# Patient Record
Sex: FEMALE | Race: AMERICAN INDIAN OR ALASKA NATIVE | NOT HISPANIC OR LATINO | Employment: FULL TIME | RURAL
[De-identification: names, ages, dates, MRNs, and addresses within clinical notes are randomized per-mention and may not be internally consistent; named-entity substitution may affect disease eponyms.]

---

## 2021-03-15 DIAGNOSIS — R06.02 SOB (SHORTNESS OF BREATH): Primary | ICD-10-CM

## 2021-03-23 DIAGNOSIS — R06.02 SHORTNESS OF BREATH: Primary | ICD-10-CM

## 2021-03-23 DIAGNOSIS — R06.2 WHEEZING: ICD-10-CM

## 2021-05-19 DIAGNOSIS — R06.02 SHORTNESS OF BREATH: Primary | ICD-10-CM

## 2021-05-19 DIAGNOSIS — R06.2 WHEEZING: ICD-10-CM

## 2021-05-19 RX ORDER — MONTELUKAST SODIUM 10 MG/1
10 TABLET ORAL NIGHTLY
COMMUNITY

## 2021-05-19 RX ORDER — IPRATROPIUM BROMIDE AND ALBUTEROL 20; 100 UG/1; UG/1
1 SPRAY, METERED RESPIRATORY (INHALATION) 4 TIMES DAILY
COMMUNITY

## 2021-05-19 RX ORDER — METOPROLOL SUCCINATE 100 MG/1
100 TABLET, EXTENDED RELEASE ORAL DAILY
COMMUNITY

## 2021-05-19 RX ORDER — BUDESONIDE 0.25 MG/2ML
0.25 INHALANT ORAL 2 TIMES DAILY
COMMUNITY
End: 2021-05-20

## 2021-05-19 RX ORDER — LOSARTAN POTASSIUM 50 MG/1
50 TABLET ORAL DAILY
COMMUNITY

## 2021-05-19 RX ORDER — GLYBURIDE-METFORMIN HYDROCHLORIDE 5; 500 MG/1; MG/1
2 TABLET ORAL 2 TIMES DAILY WITH MEALS
COMMUNITY

## 2021-05-19 RX ORDER — IPRATROPIUM BROMIDE 0.5 MG/2.5ML
500 SOLUTION RESPIRATORY (INHALATION) EVERY 4 HOURS PRN
COMMUNITY

## 2021-05-20 ENCOUNTER — HOSPITAL ENCOUNTER (OUTPATIENT)
Dept: RADIOLOGY | Facility: HOSPITAL | Age: 41
Discharge: HOME | End: 2021-05-20
Attending: INTERNAL MEDICINE
Payer: COMMERCIAL

## 2021-05-20 ENCOUNTER — OFFICE VISIT (OUTPATIENT)
Dept: PULMONOLOGY | Facility: CLINIC | Age: 41
End: 2021-05-20
Payer: COMMERCIAL

## 2021-05-20 ENCOUNTER — CLINICAL SUPPORT (OUTPATIENT)
Dept: PULMONOLOGY | Facility: HOSPITAL | Age: 41
End: 2021-05-20
Attending: INTERNAL MEDICINE
Payer: COMMERCIAL

## 2021-05-20 VITALS
OXYGEN SATURATION: 100 % | HEIGHT: 62 IN | DIASTOLIC BLOOD PRESSURE: 104 MMHG | SYSTOLIC BLOOD PRESSURE: 148 MMHG | BODY MASS INDEX: 53.92 KG/M2 | WEIGHT: 293 LBS | HEART RATE: 64 BPM | RESPIRATION RATE: 20 BRPM

## 2021-05-20 DIAGNOSIS — R06.2 WHEEZING: ICD-10-CM

## 2021-05-20 DIAGNOSIS — G47.33 OSA (OBSTRUCTIVE SLEEP APNEA): ICD-10-CM

## 2021-05-20 DIAGNOSIS — E66.9 OBESITY (BMI 35.0-39.9 WITHOUT COMORBIDITY): ICD-10-CM

## 2021-05-20 DIAGNOSIS — R06.02 SHORTNESS OF BREATH: ICD-10-CM

## 2021-05-20 DIAGNOSIS — J45.30 MILD PERSISTENT ASTHMA, UNSPECIFIED WHETHER COMPLICATED: ICD-10-CM

## 2021-05-20 DIAGNOSIS — R06.02 SOB (SHORTNESS OF BREATH): ICD-10-CM

## 2021-05-20 PROBLEM — J45.909 ASTHMA: Status: ACTIVE | Noted: 2021-05-20

## 2021-05-20 PROCEDURE — 1126F PR PAIN SEVERITY QUANTIFIED, NO PAIN PRESENT: ICD-10-PCS | Mod: ,,, | Performed by: INTERNAL MEDICINE

## 2021-05-20 PROCEDURE — 71046 X-RAY EXAM CHEST 2 VIEWS: CPT | Mod: 26,,, | Performed by: RADIOLOGY

## 2021-05-20 PROCEDURE — 1126F AMNT PAIN NOTED NONE PRSNT: CPT | Mod: ,,, | Performed by: INTERNAL MEDICINE

## 2021-05-20 PROCEDURE — 94729 DIFFUSING CAPACITY: CPT | Mod: PBBFAC | Performed by: INTERNAL MEDICINE

## 2021-05-20 PROCEDURE — 94010 BREATHING CAPACITY TEST: CPT | Mod: PBBFAC | Performed by: INTERNAL MEDICINE

## 2021-05-20 PROCEDURE — 94727 GAS DIL/WSHOT DETER LNG VOL: CPT | Mod: 26,,, | Performed by: INTERNAL MEDICINE

## 2021-05-20 PROCEDURE — 71046 X-RAY EXAM CHEST 2 VIEWS: CPT | Mod: TC

## 2021-05-20 PROCEDURE — 94729 DIFFUSING CAPACITY: CPT

## 2021-05-20 PROCEDURE — 3008F PR BODY MASS INDEX (BMI) DOCUMENTED: ICD-10-PCS | Mod: ,,, | Performed by: INTERNAL MEDICINE

## 2021-05-20 PROCEDURE — 94727 PR PULM FUNCTION TEST BY GAS: ICD-10-PCS | Mod: 26,,, | Performed by: INTERNAL MEDICINE

## 2021-05-20 PROCEDURE — 94726 PLETHYSMOGRAPHY LUNG VOLUMES: CPT | Mod: PBBFAC | Performed by: INTERNAL MEDICINE

## 2021-05-20 PROCEDURE — 99214 PR OFFICE/OUTPT VISIT, EST, LEVL IV, 30-39 MIN: ICD-10-PCS | Mod: S$PBB,25,, | Performed by: INTERNAL MEDICINE

## 2021-05-20 PROCEDURE — 94060 EVALUATION OF WHEEZING: CPT

## 2021-05-20 PROCEDURE — 94729 PR C02/MEMBANE DIFFUSE CAPACITY: ICD-10-PCS | Mod: 26,,, | Performed by: INTERNAL MEDICINE

## 2021-05-20 PROCEDURE — 99214 OFFICE O/P EST MOD 30 MIN: CPT | Mod: S$PBB,25,, | Performed by: INTERNAL MEDICINE

## 2021-05-20 PROCEDURE — 94060 PR EVAL OF BRONCHOSPASM: ICD-10-PCS | Mod: 26,,, | Performed by: INTERNAL MEDICINE

## 2021-05-20 PROCEDURE — 94060 EVALUATION OF WHEEZING: CPT | Mod: 26,,, | Performed by: INTERNAL MEDICINE

## 2021-05-20 PROCEDURE — 3008F BODY MASS INDEX DOCD: CPT | Mod: ,,, | Performed by: INTERNAL MEDICINE

## 2021-05-20 PROCEDURE — 94729 DIFFUSING CAPACITY: CPT | Mod: 26,,, | Performed by: INTERNAL MEDICINE

## 2021-05-20 PROCEDURE — 71046 XR CHEST PA AND LATERAL: ICD-10-PCS | Mod: 26,,, | Performed by: RADIOLOGY

## 2021-05-20 PROCEDURE — 99215 OFFICE O/P EST HI 40 MIN: CPT | Mod: PBBFAC,25 | Performed by: INTERNAL MEDICINE

## 2021-05-20 RX ORDER — AMLODIPINE BESYLATE 5 MG/1
5 TABLET ORAL DAILY
Status: ON HOLD | COMMUNITY
End: 2023-09-19 | Stop reason: HOSPADM

## 2021-11-29 ENCOUNTER — HOSPITAL ENCOUNTER (OUTPATIENT)
Dept: RADIOLOGY | Facility: HOSPITAL | Age: 41
Discharge: HOME | End: 2021-11-29
Attending: FAMILY MEDICINE
Payer: COMMERCIAL

## 2021-11-29 DIAGNOSIS — M79.605 LEFT LEG PAIN: ICD-10-CM

## 2021-11-29 PROCEDURE — 93971 EXTREMITY STUDY: CPT | Mod: TC,LT

## 2023-07-28 ENCOUNTER — HOSPITAL ENCOUNTER (EMERGENCY)
Facility: HOSPITAL | Age: 43
Discharge: ANOTHER HOSPITAL | End: 2023-07-28
Attending: EMERGENCY MEDICINE
Payer: COMMERCIAL

## 2023-07-28 VITALS
SYSTOLIC BLOOD PRESSURE: 121 MMHG | TEMPERATURE: 99 F | HEIGHT: 62 IN | DIASTOLIC BLOOD PRESSURE: 97 MMHG | OXYGEN SATURATION: 96 % | BODY MASS INDEX: 53.92 KG/M2 | WEIGHT: 293 LBS | RESPIRATION RATE: 18 BRPM | HEART RATE: 82 BPM

## 2023-07-28 DIAGNOSIS — M79.89 LEG SWELLING: ICD-10-CM

## 2023-07-28 DIAGNOSIS — L03.116 CELLULITIS OF LEFT LOWER EXTREMITY: Primary | ICD-10-CM

## 2023-07-28 PROCEDURE — 99284 EMERGENCY DEPT VISIT MOD MDM: CPT | Mod: 25

## 2023-07-28 PROCEDURE — 99284 PR EMERGENCY DEPT VISIT,LEVEL IV: ICD-10-PCS | Mod: ,,, | Performed by: EMERGENCY MEDICINE

## 2023-07-28 PROCEDURE — 99284 EMERGENCY DEPT VISIT MOD MDM: CPT | Mod: ,,, | Performed by: EMERGENCY MEDICINE

## 2023-07-28 RX ORDER — SULFAMETHOXAZOLE AND TRIMETHOPRIM 800; 160 MG/1; MG/1
1 TABLET ORAL 2 TIMES DAILY
Qty: 20 TABLET | Refills: 0 | Status: SHIPPED | OUTPATIENT
Start: 2023-07-28 | End: 2023-08-07

## 2023-07-28 NOTE — ED TRIAGE NOTES
Presents to ED per [facility] from [facility] due to Elevated D Dimer and the need for ultrasound to rule out DVT.  Denies shortness of breath and chest pain.

## 2023-07-28 NOTE — DISCHARGE INSTRUCTIONS
Take antibiotics as prescribed.  Follow up in clinic at [facility].  Return to emergency department for any worsening or further problems.

## 2023-07-28 NOTE — ED NOTES
Spoke with Dr. Cummings at [facility]. Dr. Chandra will be accepting Mrs. Wilson to room 343 as a direct admit. [facility] will be coming back to transfer patient back to [facility].

## 2023-07-28 NOTE — ED PROVIDER NOTES
Encounter Date: 7/28/2023       History     Chief Complaint   Patient presents with    Abnormal Lab     Patient is a 43-year-old Native-American female who was sent here from [facility] for ultrasound of legs to rule out DVT.  Patient complains of pain and swelling in bilateral lower extremities for 1 day.  No fever, no cough, no chest pain or shortness of breath, no other acute problems or complaints.  Patient does not take hormone medication.  Patient does not smoke.  Patient has no history of cancer.  Patient has no history of previous DVT.  Abrasions on leg are from where patient visited her local medicine man and he performed these for some type of therapeutic purpose.    Review of patient's allergies indicates:  No Known Allergies  Past Medical History:   Diagnosis Date    Diabetes mellitus     Hypertension     SOB (shortness of breath)     Wheeze      Past Surgical History:   Procedure Laterality Date    CATARACT EXTRACTION       Family History   Problem Relation Age of Onset    Diabetes Mother     Hypertension Mother     Hypertension Father     Diabetes Father      Social History     Tobacco Use    Smoking status: Never    Smokeless tobacco: Never   Substance Use Topics    Alcohol use: Yes     Comment: socially    Drug use: Never     Review of Systems   Respiratory:  Negative for shortness of breath.    Cardiovascular:  Positive for leg swelling. Negative for chest pain.   All other systems reviewed and are negative.    Physical Exam     Initial Vitals [07/28/23 1150]   BP Pulse Resp Temp SpO2   108/72 77 18 98.6 °F (37 °C) 96 %      MAP       --         Physical Exam    Nursing note and vitals reviewed.  Constitutional: She appears well-developed and well-nourished.   Patient is morbidly obese.   HENT:   Head: Normocephalic.   Eyes: Pupils are equal, round, and reactive to light.   Cardiovascular:  Normal rate.           Pulmonary/Chest: Breath sounds normal.   Abdominal: Abdomen is soft.    Musculoskeletal:         General: Normal range of motion.      Comments: There is swelling and pitting edema bilateral lower extremities.  There are some superficial abrasions of left lower extremity below-the-knee with cellulitis surrounding left lower extremity.     Neurological: She is alert.   Skin: Skin is warm. Capillary refill takes less than 2 seconds.   Psychiatric: She has a normal mood and affect.       Medical Screening Exam   See Full Note    ED Course   Procedures  Labs Reviewed - No data to display       Imaging Results              US Lower Extremity Veins Bilateral (Final result)  Result time 07/28/23 12:57:45      Final result by Ramon Sanchez MD (07/28/23 12:57:45)                   Impression:      No evidence of deep venous thrombosis in either lower extremity.      Electronically signed by: Ramon Sanchez  Date:    07/28/2023  Time:    12:57               Narrative:    EXAMINATION:  US LOWER EXTREMITY VEINS BILATERAL    CLINICAL HISTORY:  Other specified soft tissue disorders    TECHNIQUE:  Duplex and color flow Doppler and dynamic compression was performed of the bilateral lower extremity veins.  Ultrasound images captured and stored.    COMPARISON:  None    FINDINGS:  Right thigh veins: The common femoral, femoral, popliteal, upper greater saphenous, and deep femoral veins are patent and free of thrombus. The veins are normally compressible and have normal phasic flow and augmentation response.    Right calf veins: The visualized calf veins are patent.    Left thigh veins: The common femoral, femoral, popliteal, upper greater saphenous, and deep femoral veins are patent and free of thrombus. The veins are normally compressible and have normal phasic flow and augmentation response.    Left calf veins: The visualized calf veins are patent.    Miscellaneous: None                                       Medications - No data to display              ED Course as of 07/28/23 1322   Fri Jul 28, 2023    1156 Medical decision-making:  Differential diagnosis includes cellulitis, pedal edema, DVT.  Doppler ultrasound of bilateral lower extremities was ordered by me and interpreted by Radiology. [BB]   1319 Venous Dopplers of both legs are negative for DVT. [BB]      ED Course User Index  [BB] Severiano Obrien MD                Clinical Impression:   Final diagnoses:  [M79.89] Leg swelling  [L03.116] Cellulitis of left lower extremity (Primary)        ED Disposition Condition    Discharge Stable          ED Prescriptions       Medication Sig Dispense Start Date End Date Auth. Provider    sulfamethoxazole-trimethoprim 800-160mg (BACTRIM DS) 800-160 mg Tab Take 1 tablet by mouth 2 (two) times daily. for 10 days 20 tablet 7/28/2023 8/7/2023 Severiano Obrien MD          Follow-up Information    None          Severiano Obrien MD  07/28/23 5918

## 2023-08-15 ENCOUNTER — TELEPHONE (OUTPATIENT)
Dept: ADMINISTRATIVE | Facility: HOSPITAL | Age: 43
End: 2023-08-15
Payer: COMMERCIAL

## 2023-08-15 ENCOUNTER — OFFICE VISIT (OUTPATIENT)
Dept: CARDIOLOGY | Facility: CLINIC | Age: 43
End: 2023-08-15
Payer: COMMERCIAL

## 2023-08-15 VITALS
RESPIRATION RATE: 18 BRPM | HEART RATE: 89 BPM | SYSTOLIC BLOOD PRESSURE: 140 MMHG | BODY MASS INDEX: 53.92 KG/M2 | WEIGHT: 293 LBS | HEIGHT: 62 IN | DIASTOLIC BLOOD PRESSURE: 80 MMHG

## 2023-08-15 DIAGNOSIS — I73.9 CLAUDICATION: ICD-10-CM

## 2023-08-15 DIAGNOSIS — G47.33 OSA (OBSTRUCTIVE SLEEP APNEA): Primary | ICD-10-CM

## 2023-08-15 DIAGNOSIS — S81.801A WOUND OF RIGHT LOWER EXTREMITY, INITIAL ENCOUNTER: Primary | ICD-10-CM

## 2023-08-15 DIAGNOSIS — E11.9 DIABETES MELLITUS WITHOUT COMPLICATION: Primary | ICD-10-CM

## 2023-08-15 DIAGNOSIS — I96 GANGRENE OF TOE OF RIGHT FOOT: ICD-10-CM

## 2023-08-15 DIAGNOSIS — G47.33 OSA (OBSTRUCTIVE SLEEP APNEA): ICD-10-CM

## 2023-08-15 PROCEDURE — 93010 ELECTROCARDIOGRAM REPORT: CPT | Mod: S$PBB,,, | Performed by: STUDENT IN AN ORGANIZED HEALTH CARE EDUCATION/TRAINING PROGRAM

## 2023-08-15 PROCEDURE — 3008F BODY MASS INDEX DOCD: CPT | Mod: ,,, | Performed by: STUDENT IN AN ORGANIZED HEALTH CARE EDUCATION/TRAINING PROGRAM

## 2023-08-15 PROCEDURE — 93010 EKG 12-LEAD: ICD-10-PCS | Mod: S$PBB,,, | Performed by: STUDENT IN AN ORGANIZED HEALTH CARE EDUCATION/TRAINING PROGRAM

## 2023-08-15 PROCEDURE — 1159F PR MEDICATION LIST DOCUMENTED IN MEDICAL RECORD: ICD-10-PCS | Mod: ,,, | Performed by: STUDENT IN AN ORGANIZED HEALTH CARE EDUCATION/TRAINING PROGRAM

## 2023-08-15 PROCEDURE — 3008F PR BODY MASS INDEX (BMI) DOCUMENTED: ICD-10-PCS | Mod: ,,, | Performed by: STUDENT IN AN ORGANIZED HEALTH CARE EDUCATION/TRAINING PROGRAM

## 2023-08-15 PROCEDURE — 99204 PR OFFICE/OUTPT VISIT, NEW, LEVL IV, 45-59 MIN: ICD-10-PCS | Mod: S$PBB,,, | Performed by: STUDENT IN AN ORGANIZED HEALTH CARE EDUCATION/TRAINING PROGRAM

## 2023-08-15 PROCEDURE — 3077F SYST BP >= 140 MM HG: CPT | Mod: ,,, | Performed by: STUDENT IN AN ORGANIZED HEALTH CARE EDUCATION/TRAINING PROGRAM

## 2023-08-15 PROCEDURE — 1159F MED LIST DOCD IN RCRD: CPT | Mod: ,,, | Performed by: STUDENT IN AN ORGANIZED HEALTH CARE EDUCATION/TRAINING PROGRAM

## 2023-08-15 PROCEDURE — 93005 ELECTROCARDIOGRAM TRACING: CPT | Mod: PBBFAC | Performed by: STUDENT IN AN ORGANIZED HEALTH CARE EDUCATION/TRAINING PROGRAM

## 2023-08-15 PROCEDURE — 3077F PR MOST RECENT SYSTOLIC BLOOD PRESSURE >= 140 MM HG: ICD-10-PCS | Mod: ,,, | Performed by: STUDENT IN AN ORGANIZED HEALTH CARE EDUCATION/TRAINING PROGRAM

## 2023-08-15 PROCEDURE — 3079F PR MOST RECENT DIASTOLIC BLOOD PRESSURE 80-89 MM HG: ICD-10-PCS | Mod: ,,, | Performed by: STUDENT IN AN ORGANIZED HEALTH CARE EDUCATION/TRAINING PROGRAM

## 2023-08-15 PROCEDURE — 99215 OFFICE O/P EST HI 40 MIN: CPT | Mod: PBBFAC | Performed by: STUDENT IN AN ORGANIZED HEALTH CARE EDUCATION/TRAINING PROGRAM

## 2023-08-15 PROCEDURE — 99204 OFFICE O/P NEW MOD 45 MIN: CPT | Mod: S$PBB,,, | Performed by: STUDENT IN AN ORGANIZED HEALTH CARE EDUCATION/TRAINING PROGRAM

## 2023-08-15 PROCEDURE — 3079F DIAST BP 80-89 MM HG: CPT | Mod: ,,, | Performed by: STUDENT IN AN ORGANIZED HEALTH CARE EDUCATION/TRAINING PROGRAM

## 2023-08-15 RX ORDER — ALBUTEROL SULFATE 90 UG/1
1 AEROSOL, METERED RESPIRATORY (INHALATION)
COMMUNITY

## 2023-08-15 RX ORDER — FUROSEMIDE 80 MG/1
TABLET ORAL
COMMUNITY

## 2023-08-15 RX ORDER — TRAMADOL HYDROCHLORIDE 50 MG/1
50 TABLET ORAL 2 TIMES DAILY PRN
COMMUNITY

## 2023-08-15 RX ORDER — LEVOTHYROXINE SODIUM 100 UG/1
TABLET ORAL
COMMUNITY

## 2023-08-15 RX ORDER — FUROSEMIDE 40 MG/1
40 TABLET ORAL
COMMUNITY

## 2023-08-15 RX ORDER — HYDRALAZINE HYDROCHLORIDE 100 MG/1
1 TABLET, FILM COATED ORAL 2 TIMES DAILY
COMMUNITY
Start: 2022-10-26 | End: 2023-12-20

## 2023-08-15 RX ORDER — PREGABALIN 75 MG/1
75 CAPSULE ORAL
COMMUNITY

## 2023-08-15 RX ORDER — MONTELUKAST SODIUM 10 MG/1
TABLET ORAL
COMMUNITY

## 2023-08-15 RX ORDER — CARVEDILOL 12.5 MG/1
TABLET ORAL
COMMUNITY

## 2023-08-15 RX ORDER — PREDNISONE 20 MG/1
TABLET ORAL
COMMUNITY

## 2023-08-15 RX ORDER — MULTIVITAMIN
1 TABLET ORAL
COMMUNITY

## 2023-08-15 RX ORDER — ASPIRIN 325 MG
50000 TABLET, DELAYED RELEASE (ENTERIC COATED) ORAL
COMMUNITY

## 2023-08-15 RX ORDER — HYDROXYCHLOROQUINE SULFATE 200 MG/1
TABLET, FILM COATED ORAL
COMMUNITY

## 2023-08-15 RX ORDER — SEVELAMER CARBONATE 800 MG/1
TABLET, FILM COATED ORAL
COMMUNITY
Start: 2022-10-26

## 2023-08-15 RX ORDER — HYDRALAZINE HYDROCHLORIDE 50 MG/1
TABLET, FILM COATED ORAL
Status: ON HOLD | COMMUNITY
End: 2023-09-19 | Stop reason: HOSPADM

## 2023-08-15 RX ORDER — LEVOTHYROXINE SODIUM 50 UG/1
50 TABLET ORAL
COMMUNITY
End: 2023-12-20

## 2023-08-15 RX ORDER — AMLODIPINE BESYLATE 10 MG/1
10 TABLET ORAL
COMMUNITY

## 2023-08-15 RX ORDER — MYCOPHENOLATE MOFETIL 500 MG/1
TABLET ORAL
COMMUNITY
Start: 2023-03-30

## 2023-08-15 RX ORDER — ROSUVASTATIN CALCIUM 10 MG/1
TABLET, COATED ORAL
COMMUNITY

## 2023-08-15 RX ORDER — METOLAZONE 5 MG/1
TABLET ORAL
COMMUNITY

## 2023-08-15 RX ORDER — NIFEDIPINE 60 MG/1
TABLET, EXTENDED RELEASE ORAL
COMMUNITY
Start: 2022-10-27

## 2023-08-15 RX ORDER — FAMOTIDINE 20 MG/1
TABLET, FILM COATED ORAL
COMMUNITY
Start: 2023-02-01

## 2023-08-15 NOTE — PROGRESS NOTES
"PCP: Breana Terrell DO    Referring Provider:     Subjective:   Panchito Wilson is a 43 y.o. female with hx of severe obesity, DM, HLD who presents for evaluation of right lower extremity wound.    Patient referred from Dr. Brandon carter for evaluation of PAD.   Patient reports having 2 week of right lower extremity wound on the toes. Her right DP is palpable and right PT is dopplerable. She had a venous US that was normal.     Will get a QUAN with duplex.     She follows Dr. Baeza at [facility] for cardiac issues.     Fhx: non-contributary  Shx: Denies smoking, etoh or drug use    EKG - 8/15/23 - NSR< RBBB      Lab Results   Component Value Date    K 5.6 (H) 10/20/2022       No results found for: "CHOL"  No results found for: "HDL"  No results found for: "LDLCALC"  No results found for: "TRIG"  No results found for: "CHOLHDL"    No results found for: "WBC", "HGB", "HCT", "MCV", "PLT"        Current Outpatient Medications:     albuterol sulfate (PROAIR RESPICLICK) 90 mcg/actuation inhaler, Inhale 1 puff into the lungs every 4 (four) hours as needed., Disp: , Rfl:     carvediloL (COREG) 12.5 MG tablet, 1 tablet with food Orally Twice a day for 30 day(s), Disp: , Rfl:     cholecalciferol, vitamin D3, 1,250 mcg (50,000 unit) capsule, Take 50,000 Units by mouth., Disp: , Rfl:     famotidine (PEPCID) 20 MG tablet, 1 tablet at bedtime as needed Orally Once a day while on Prednisone for 30 day(s), Disp: , Rfl:     furosemide (LASIX) 80 MG tablet, 1 tablet Orally BID, Disp: , Rfl:     hydrALAZINE (APRESOLINE) 50 MG tablet, 1 tablet with food Orally Three times a day for 30 day(s), Disp: , Rfl:     hydrOXYchloroQUINE (PLAQUENIL) 200 mg tablet, one tablet Orally twice a day for 30 days, Disp: , Rfl:     insulin  unit/mL injection, Inject into the skin 2 (two) times daily before meals., Disp: , Rfl:     insulin NPH-insulin regular, 70/30, (HUMULIN 70/30 U-100 INSULIN) 100 unit/mL (70-30) injection, Inject 90 Units into the skin " once daily., Disp: , Rfl:     ipratropium-albuteroL (COMBIVENT RESPIMAT)  mcg/actuation inhaler, Inhale 1 puff into the lungs 4 (four) times daily. Rescue, Disp: , Rfl:     metOLazone (ZAROXOLYN) 5 MG tablet, 1 tablet Orally Once a day for 30 day(s), Disp: , Rfl:     mometasone (ASMANEX TWISTHALER) 220 mcg/ actuation (30) inhaler, Inhale 1 puff into the lungs 2 (two) times a day. Controller, Disp: , Rfl:     montelukast (SINGULAIR) 10 mg tablet, Take 10 mg by mouth every evening., Disp: , Rfl:     multivitamin with minerals tablet, Take 1 tablet by mouth once daily., Disp: , Rfl:     mycophenolate (CELLCEPT) 500 mg Tab, 1 tablet Orally Twice a day for 30 days, Disp: , Rfl:     predniSONE (DELTASONE) 20 MG tablet, 1 tablet as needed Orally Once a day for 30 day(s), Disp: , Rfl:     pregabalin (LYRICA) 75 MG capsule, Take 75 mg by mouth., Disp: , Rfl:     rosuvastatin (CRESTOR) 10 MG tablet, 1 tablet Orally Once a day for 30 day(s), Disp: , Rfl:     traMADoL (ULTRAM) 50 mg tablet, Take 50 mg by mouth 2 (two) times daily as needed for Pain., Disp: , Rfl:     albuterol (PROVENTIL/VENTOLIN HFA) 90 mcg/actuation inhaler, Inhale 1 puff into the lungs., Disp: , Rfl:     amLODIPine (NORVASC) 10 MG tablet, Take 10 mg by mouth., Disp: , Rfl:     amLODIPine (NORVASC) 5 MG tablet, Take 5 mg by mouth once daily., Disp: , Rfl:     budesonide (PULMICORT FLEXHALER) 90 mcg/actuation AePB, Inhale 2 puffs into the lungs every 12 (twelve) hours., Disp: , Rfl:     empagliflozin (JARDIANCE) 10 mg tablet, Take by mouth., Disp: , Rfl:     furosemide (LASIX) 40 MG tablet, Take 40 mg by mouth., Disp: , Rfl:     glyBURIDE-metformin 5-500 mg (GLUCOVANCE) 5-500 mg Tab, Take 2 tablets by mouth 2 (two) times daily with meals., Disp: , Rfl:     hydrALAZINE (APRESOLINE) 100 MG tablet, Take 1 tablet by mouth 2 (two) times daily., Disp: , Rfl:     ipratropium (ATROVENT) 0.02 % nebulizer solution, Take 500 mcg by nebulization every 4 (four) hours  "as needed for Wheezing. Rescue:  One Unit every 4 Hr PRN, Disp: , Rfl:     levothyroxine (SYNTHROID) 100 MCG tablet, 1 tablet in the morning on an empty stomach Orally Once a day for 30 day(s), Disp: , Rfl:     levothyroxine (SYNTHROID) 50 MCG tablet, Take 50 mcg by mouth., Disp: , Rfl:     losartan (COZAAR) 50 MG tablet, Take 50 mg by mouth once daily., Disp: , Rfl:     metoprolol succinate (TOPROL-XL) 100 MG 24 hr tablet, Take 100 mg by mouth once daily., Disp: , Rfl:     montelukast (SINGULAIR) 10 mg tablet, 1 tablet Orally Once a day for 30 day(s), Disp: , Rfl:     multivitamin (THERAGRAN) per tablet, Take 1 tablet by mouth., Disp: , Rfl:     NIFEdipine (ADALAT CC) 60 MG TbSR, 1 tablet on an empty stomach Orally Once a day for 30 day(s), Disp: , Rfl:     sevelamer carbonate (RENVELA) 800 mg Tab, 1 tablet with meals Orally Three times a day for 30 day(s), Disp: , Rfl:     torsemide 40 mg Tab, Take 40 mg by mouth., Disp: , Rfl:     Review of Systems   Respiratory:  Negative for cough and shortness of breath.    Cardiovascular:  Negative for chest pain, palpitations, orthopnea, claudication, leg swelling and PND.         Objective:   BP (!) 140/80   Pulse 89   Resp 18   Ht 5' 2" (1.575 m)   Wt (!) 181.4 kg (400 lb)   BMI 73.16 kg/m²     Physical Exam  Constitutional:       Appearance: Normal appearance.   Cardiovascular:      Rate and Rhythm: Normal rate and regular rhythm.      Pulses:           Dorsalis pedis pulses are 1+ on the right side.        Posterior tibial pulses are detected w/ Doppler on the right side.      Heart sounds: Normal heart sounds. No murmur heard.  Pulmonary:      Effort: Pulmonary effort is normal.      Breath sounds: Normal breath sounds.   Musculoskeletal:         General: No swelling.   Neurological:      Mental Status: She is alert and oriented to person, place, and time.           Assessment:     1. Diabetes mellitus without complication  US Lower Extrem Arteries Bilat with QUAN " (xpd)    Basic Metabolic Panel    Hemoglobin A1C    CBC Auto Differential      2. ROWDY (obstructive sleep apnea)  EKG 12-lead            Plan:   No problem-specific Assessment & Plan notes found for this encounter.      Right foot wound - 2 week wound, seen at [facility]. Right DP palpable and right PT dopplerable. Will get QUAN with US arterial doppler.     DM - check AIC ,. Patient reports CKD - will get BMP check as well.

## 2023-08-24 ENCOUNTER — HOSPITAL ENCOUNTER (OUTPATIENT)
Dept: RADIOLOGY | Facility: HOSPITAL | Age: 43
Discharge: HOME | End: 2023-08-24
Attending: STUDENT IN AN ORGANIZED HEALTH CARE EDUCATION/TRAINING PROGRAM
Payer: COMMERCIAL

## 2023-08-24 DIAGNOSIS — E11.9 DIABETES MELLITUS WITHOUT COMPLICATION: ICD-10-CM

## 2023-08-24 PROCEDURE — 93925 US ARTERIAL LOWER EXTREMITY BILAT WITH ABI (XPD): ICD-10-PCS | Mod: 26,,, | Performed by: RADIOLOGY

## 2023-08-24 PROCEDURE — 93922 UPR/L XTREMITY ART 2 LEVELS: CPT | Mod: 26,,, | Performed by: RADIOLOGY

## 2023-08-24 PROCEDURE — 93925 LOWER EXTREMITY STUDY: CPT | Mod: TC

## 2023-08-24 PROCEDURE — 93925 LOWER EXTREMITY STUDY: CPT | Mod: 26,,, | Performed by: RADIOLOGY

## 2023-08-24 PROCEDURE — 93922 US ARTERIAL LOWER EXTREMITY BILAT WITH ABI (XPD): ICD-10-PCS | Mod: 26,,, | Performed by: RADIOLOGY

## 2023-09-08 RX ORDER — SODIUM CHLORIDE 0.9 % (FLUSH) 0.9 %
2 SYRINGE (ML) INJECTION
Status: CANCELLED | OUTPATIENT
Start: 2023-09-19

## 2023-09-08 NOTE — PROGRESS NOTES
Pt. Notified in-conclusive doppler assessment of leg circulation will need peripheral angiography for right foot wound per Dr. Olson. Pt. Voiced understanding.

## 2023-09-13 ENCOUNTER — TELEPHONE (OUTPATIENT)
Dept: CARDIOLOGY | Facility: CLINIC | Age: 43
End: 2023-09-13
Payer: COMMERCIAL

## 2023-09-13 DIAGNOSIS — Z01.818 PREOP EXAMINATION: Primary | ICD-10-CM

## 2023-09-13 NOTE — TELEPHONE ENCOUNTER
Spoke to pt. Jose for peripheral angigram 9-19-23 check in at 7:00a.m at ambulatory building NPO after midnight ok to take usual meds am of procedure hold diabetic meds if taking bring medications and  to appt. Pt. Voiced understanding.

## 2023-09-19 ENCOUNTER — HOSPITAL ENCOUNTER (OUTPATIENT)
Facility: HOSPITAL | Age: 43
Discharge: HOME | End: 2023-09-19
Attending: STUDENT IN AN ORGANIZED HEALTH CARE EDUCATION/TRAINING PROGRAM | Admitting: STUDENT IN AN ORGANIZED HEALTH CARE EDUCATION/TRAINING PROGRAM
Payer: COMMERCIAL

## 2023-09-19 VITALS
RESPIRATION RATE: 18 BRPM | DIASTOLIC BLOOD PRESSURE: 76 MMHG | OXYGEN SATURATION: 98 % | SYSTOLIC BLOOD PRESSURE: 157 MMHG | HEART RATE: 70 BPM

## 2023-09-19 DIAGNOSIS — I73.9 CLAUDICATION: ICD-10-CM

## 2023-09-19 DIAGNOSIS — S81.801A WOUND OF RIGHT LOWER EXTREMITY, INITIAL ENCOUNTER: ICD-10-CM

## 2023-09-19 DIAGNOSIS — Z01.818 PREOP EXAMINATION: ICD-10-CM

## 2023-09-19 DIAGNOSIS — I96 GANGRENE OF TOE OF RIGHT FOOT: ICD-10-CM

## 2023-09-19 LAB
ANION GAP SERPL CALCULATED.3IONS-SCNC: 11 MMOL/L (ref 7–16)
BASOPHILS # BLD AUTO: 0.09 K/UL (ref 0–0.2)
BASOPHILS NFR BLD AUTO: 0.8 % (ref 0–1)
BUN SERPL-MCNC: 68 MG/DL (ref 7–18)
BUN/CREAT SERPL: 22 (ref 6–20)
CALCIUM SERPL-MCNC: 7 MG/DL (ref 8.5–10.1)
CHLORIDE SERPL-SCNC: 108 MMOL/L (ref 98–107)
CO2 SERPL-SCNC: 26 MMOL/L (ref 21–32)
CREAT SERPL-MCNC: 3.1 MG/DL (ref 0.55–1.02)
DIFFERENTIAL METHOD BLD: ABNORMAL
EGFR (NO RACE VARIABLE) (RUSH/TITUS): 18 ML/MIN/1.73M2
EOSINOPHIL # BLD AUTO: 1.7 K/UL (ref 0–0.5)
EOSINOPHIL NFR BLD AUTO: 15.8 % (ref 1–4)
ERYTHROCYTE [DISTWIDTH] IN BLOOD BY AUTOMATED COUNT: 15.3 % (ref 11.5–14.5)
GLUCOSE SERPL-MCNC: 87 MG/DL (ref 74–106)
GLUCOSE SERPL-MCNC: 95 MG/DL (ref 70–105)
HCT VFR BLD AUTO: 32 % (ref 38–47)
HGB BLD-MCNC: 10.2 G/DL (ref 12–16)
IMM GRANULOCYTES # BLD AUTO: 0.07 K/UL (ref 0–0.04)
IMM GRANULOCYTES NFR BLD: 0.7 % (ref 0–0.4)
LYMPHOCYTES # BLD AUTO: 1.39 K/UL (ref 1–4.8)
LYMPHOCYTES NFR BLD AUTO: 12.9 % (ref 27–41)
MCH RBC QN AUTO: 27.4 PG (ref 27–31)
MCHC RBC AUTO-ENTMCNC: 31.9 G/DL (ref 32–36)
MCV RBC AUTO: 86 FL (ref 80–96)
MONOCYTES # BLD AUTO: 0.79 K/UL (ref 0–0.8)
MONOCYTES NFR BLD AUTO: 7.3 % (ref 2–6)
MPC BLD CALC-MCNC: 10.8 FL (ref 9.4–12.4)
NEUTROPHILS # BLD AUTO: 6.72 K/UL (ref 1.8–7.7)
NEUTROPHILS NFR BLD AUTO: 62.5 % (ref 53–65)
NRBC # BLD AUTO: 0 X10E3/UL
NRBC, AUTO (.00): 0 %
PLATELET # BLD AUTO: 203 K/UL (ref 150–400)
POTASSIUM SERPL-SCNC: 3.4 MMOL/L (ref 3.5–5.1)
RBC # BLD AUTO: 3.72 M/UL (ref 4.2–5.4)
SODIUM SERPL-SCNC: 142 MMOL/L (ref 136–145)
WBC # BLD AUTO: 10.76 K/UL (ref 4.5–11)

## 2023-09-19 PROCEDURE — 25500020 PHARM REV CODE 255: Performed by: STUDENT IN AN ORGANIZED HEALTH CARE EDUCATION/TRAINING PROGRAM

## 2023-09-19 PROCEDURE — 99152 MOD SED SAME PHYS/QHP 5/>YRS: CPT | Performed by: STUDENT IN AN ORGANIZED HEALTH CARE EDUCATION/TRAINING PROGRAM

## 2023-09-19 PROCEDURE — 99153 MOD SED SAME PHYS/QHP EA: CPT | Performed by: STUDENT IN AN ORGANIZED HEALTH CARE EDUCATION/TRAINING PROGRAM

## 2023-09-19 PROCEDURE — 75710 PR  ANGIO EXTREMITY UNILAT: ICD-10-PCS | Mod: 26,RT,, | Performed by: STUDENT IN AN ORGANIZED HEALTH CARE EDUCATION/TRAINING PROGRAM

## 2023-09-19 PROCEDURE — 36245 INS CATH ABD/L-EXT ART 1ST: CPT | Mod: RT | Performed by: STUDENT IN AN ORGANIZED HEALTH CARE EDUCATION/TRAINING PROGRAM

## 2023-09-19 PROCEDURE — 36245 PR INS CATH ABD/L-EXT ART 1ST ORDER: ICD-10-PCS | Mod: RT,,, | Performed by: STUDENT IN AN ORGANIZED HEALTH CARE EDUCATION/TRAINING PROGRAM

## 2023-09-19 PROCEDURE — 63600175 PHARM REV CODE 636 W HCPCS: Performed by: STUDENT IN AN ORGANIZED HEALTH CARE EDUCATION/TRAINING PROGRAM

## 2023-09-19 PROCEDURE — 99152 PR MOD CONSCIOUS SEDATION, SAME PHYS, 5+ YRS, FIRST 15 MIN: ICD-10-PCS | Mod: ,,, | Performed by: STUDENT IN AN ORGANIZED HEALTH CARE EDUCATION/TRAINING PROGRAM

## 2023-09-19 PROCEDURE — 99152 MOD SED SAME PHYS/QHP 5/>YRS: CPT | Mod: ,,, | Performed by: STUDENT IN AN ORGANIZED HEALTH CARE EDUCATION/TRAINING PROGRAM

## 2023-09-19 PROCEDURE — 25000003 PHARM REV CODE 250: Performed by: STUDENT IN AN ORGANIZED HEALTH CARE EDUCATION/TRAINING PROGRAM

## 2023-09-19 PROCEDURE — 82962 GLUCOSE BLOOD TEST: CPT

## 2023-09-19 PROCEDURE — 93010 ELECTROCARDIOGRAM REPORT: CPT | Mod: 59,,, | Performed by: STUDENT IN AN ORGANIZED HEALTH CARE EDUCATION/TRAINING PROGRAM

## 2023-09-19 PROCEDURE — 27201423 OPTIME MED/SURG SUP & DEVICES STERILE SUPPLY: Performed by: STUDENT IN AN ORGANIZED HEALTH CARE EDUCATION/TRAINING PROGRAM

## 2023-09-19 PROCEDURE — 80048 BASIC METABOLIC PNL TOTAL CA: CPT | Performed by: STUDENT IN AN ORGANIZED HEALTH CARE EDUCATION/TRAINING PROGRAM

## 2023-09-19 PROCEDURE — C1894 INTRO/SHEATH, NON-LASER: HCPCS | Performed by: STUDENT IN AN ORGANIZED HEALTH CARE EDUCATION/TRAINING PROGRAM

## 2023-09-19 PROCEDURE — 85025 COMPLETE CBC W/AUTO DIFF WBC: CPT | Performed by: STUDENT IN AN ORGANIZED HEALTH CARE EDUCATION/TRAINING PROGRAM

## 2023-09-19 PROCEDURE — 75710 ARTERY X-RAYS ARM/LEG: CPT | Mod: 26,RT,, | Performed by: STUDENT IN AN ORGANIZED HEALTH CARE EDUCATION/TRAINING PROGRAM

## 2023-09-19 PROCEDURE — 99499 NO LOS: ICD-10-PCS | Mod: ,,, | Performed by: STUDENT IN AN ORGANIZED HEALTH CARE EDUCATION/TRAINING PROGRAM

## 2023-09-19 PROCEDURE — C1769 GUIDE WIRE: HCPCS | Performed by: STUDENT IN AN ORGANIZED HEALTH CARE EDUCATION/TRAINING PROGRAM

## 2023-09-19 PROCEDURE — 36245 INS CATH ABD/L-EXT ART 1ST: CPT | Mod: RT,,, | Performed by: STUDENT IN AN ORGANIZED HEALTH CARE EDUCATION/TRAINING PROGRAM

## 2023-09-19 PROCEDURE — 75710 ARTERY X-RAYS ARM/LEG: CPT | Mod: RT | Performed by: STUDENT IN AN ORGANIZED HEALTH CARE EDUCATION/TRAINING PROGRAM

## 2023-09-19 PROCEDURE — 93005 ELECTROCARDIOGRAM TRACING: CPT

## 2023-09-19 PROCEDURE — 93010 EKG 12-LEAD: ICD-10-PCS | Mod: 59,,, | Performed by: STUDENT IN AN ORGANIZED HEALTH CARE EDUCATION/TRAINING PROGRAM

## 2023-09-19 PROCEDURE — 99499 UNLISTED E&M SERVICE: CPT | Mod: ,,, | Performed by: STUDENT IN AN ORGANIZED HEALTH CARE EDUCATION/TRAINING PROGRAM

## 2023-09-19 RX ORDER — SODIUM CHLORIDE 9 MG/ML
INJECTION, SOLUTION INTRAVENOUS
Status: DISCONTINUED | OUTPATIENT
Start: 2023-09-19 | End: 2023-09-19 | Stop reason: HOSPADM

## 2023-09-19 RX ORDER — DIAZEPAM 5 MG/1
TABLET ORAL
Status: DISCONTINUED | OUTPATIENT
Start: 2023-09-19 | End: 2023-09-19 | Stop reason: HOSPADM

## 2023-09-19 RX ORDER — FENTANYL CITRATE 50 UG/ML
INJECTION, SOLUTION INTRAMUSCULAR; INTRAVENOUS
Status: DISCONTINUED | OUTPATIENT
Start: 2023-09-19 | End: 2023-09-19 | Stop reason: HOSPADM

## 2023-09-19 RX ORDER — ACETAMINOPHEN 325 MG/1
650 TABLET ORAL EVERY 4 HOURS PRN
Status: DISCONTINUED | OUTPATIENT
Start: 2023-09-19 | End: 2023-09-19 | Stop reason: HOSPADM

## 2023-09-19 RX ORDER — MIDAZOLAM HYDROCHLORIDE 1 MG/ML
INJECTION INTRAMUSCULAR; INTRAVENOUS
Status: DISCONTINUED | OUTPATIENT
Start: 2023-09-19 | End: 2023-09-19 | Stop reason: HOSPADM

## 2023-09-19 RX ORDER — SODIUM CHLORIDE 0.9 % (FLUSH) 0.9 %
2 SYRINGE (ML) INJECTION
Status: DISCONTINUED | OUTPATIENT
Start: 2023-09-19 | End: 2023-09-19 | Stop reason: HOSPADM

## 2023-09-19 RX ORDER — LIDOCAINE HYDROCHLORIDE 10 MG/ML
INJECTION INFILTRATION; PERINEURAL
Status: DISCONTINUED | OUTPATIENT
Start: 2023-09-19 | End: 2023-09-19 | Stop reason: HOSPADM

## 2023-09-19 RX ORDER — ONDANSETRON 4 MG/1
8 TABLET, ORALLY DISINTEGRATING ORAL EVERY 8 HOURS PRN
Status: DISCONTINUED | OUTPATIENT
Start: 2023-09-19 | End: 2023-09-19 | Stop reason: HOSPADM

## 2023-09-19 RX ORDER — HEPARIN SODIUM 1000 [USP'U]/ML
INJECTION, SOLUTION INTRAVENOUS; SUBCUTANEOUS
Status: DISCONTINUED | OUTPATIENT
Start: 2023-09-19 | End: 2023-09-19 | Stop reason: HOSPADM

## 2023-09-19 RX ORDER — FENTANYL CITRATE 50 UG/ML
25 INJECTION, SOLUTION INTRAMUSCULAR; INTRAVENOUS ONCE
Status: COMPLETED | OUTPATIENT
Start: 2023-09-19 | End: 2023-09-19

## 2023-09-19 RX ADMIN — FENTANYL CITRATE 25 MCG: 50 INJECTION, SOLUTION INTRAMUSCULAR; INTRAVENOUS at 12:09

## 2023-09-19 NOTE — NURSING
1005 Pt rec'd to room at this time post procedure. Pt alert and oriented to self and place. TR band intact to right radial wrist. Pulses palpable. VSS. Normal saline infusing at 75ml/hr. See flowsheet for details. Bed in lowest position, side rails up x2, surgery checks ongoing. No needs voiced at this time.

## 2023-09-19 NOTE — Clinical Note
Bedside report given to Gilma MCNEIL in cath lab recover room 1. Pt is awake, no complaints at this time. Distal radial pulse +2, right radial site clean dry and intact, band intact. /90 (110), Hr 64, RR 16, sats 100% on room air.

## 2023-09-19 NOTE — DISCHARGE SUMMARY
[facility] - Cath Lab  Discharge Note  Short Stay    Procedure(s) (LRB):  Peripheral angiography (N/A)      OUTCOME: Patient tolerated treatment/procedure well without complication and is now ready for discharge.    DISPOSITION: Home or Self Care    FINAL DIAGNOSIS:  Wound of right leg    FOLLOWUP: In clinic    DISCHARGE INSTRUCTIONS:  No discharge procedures on file.     TIME SPENT ON DISCHARGE: 20 minutes

## 2023-09-19 NOTE — Clinical Note
The radial band was applied to the right radial artery. 17 cc's of air were inserted into the closure device.

## 2023-09-19 NOTE — NURSING
1245 TR band removal completed at this time. No active bleeding to site. Pressure dressing applied. Discharge packet given to and reviewed with patient and SO at this time. No questions voiced. Pt voiced understanding.

## 2023-09-19 NOTE — H&P
[facility] - Cath Lab  History & Physical    Subjective:      Chief Complaint/Reason for Admission: right foot wound    Panchito Wilson is a 43 y.o. female.  Panchito Wilson is a 43 y.o. female with hx of severe obesity, DM, HLD who presents for evaluation of right lower extremity wound.     Patient referred from Dr. Taylor clinic for evaluation of PAD.   Patient reports having 2 week of right lower extremity wound on the toes. Her right DP is palpable and right PT is dopplerable. She had a venous US that was normal.      Will get a QUAN with duplex.      She follows Dr. Baeza at [facility] for cardiac issues.      Fhx: non-contributary  Shx: Denies smoking, etoh or drug use     EKG - 8/15/23 - NSR< RBBB  QUAN with arterial duplex 8/15/23  Severely limited QUAN evaluation.  No arterial occlusions demonstrated. Mildly elevated velocity within the proximal right SFA measuring 137 centimeters/second.The velocities are otherwise within normal limits. Waveforms are predominantly multiphasic. Grayscale imaging demonstrates bilateral atherosclerotic plaque.  Past Medical History:   Diagnosis Date    Diabetes mellitus     Hypertension     SOB (shortness of breath)     Wheeze      Past Surgical History:   Procedure Laterality Date    CATARACT EXTRACTION       Family History   Problem Relation Age of Onset    Diabetes Mother     Hypertension Mother     Hypertension Father     Diabetes Father      Social History     Tobacco Use    Smoking status: Never    Smokeless tobacco: Never   Substance Use Topics    Alcohol use: Yes     Comment: socially    Drug use: Never       PTA Medications   Medication Sig    albuterol (PROVENTIL/VENTOLIN HFA) 90 mcg/actuation inhaler Inhale 1 puff into the lungs.    albuterol sulfate (PROAIR RESPICLICK) 90 mcg/actuation inhaler Inhale 1 puff into the lungs every 4 (four) hours as needed.    amLODIPine (NORVASC) 10 MG tablet Take 10 mg by mouth.    amLODIPine (NORVASC) 5 MG tablet Take 5 mg by mouth  once daily.    budesonide (PULMICORT FLEXHALER) 90 mcg/actuation AePB Inhale 2 puffs into the lungs every 12 (twelve) hours.    carvediloL (COREG) 12.5 MG tablet 1 tablet with food Orally Twice a day for 30 day(s)    cholecalciferol, vitamin D3, 1,250 mcg (50,000 unit) capsule Take 50,000 Units by mouth.    empagliflozin (JARDIANCE) 10 mg tablet Take by mouth.    famotidine (PEPCID) 20 MG tablet 1 tablet at bedtime as needed Orally Once a day while on Prednisone for 30 day(s)    furosemide (LASIX) 40 MG tablet Take 40 mg by mouth.    furosemide (LASIX) 80 MG tablet 1 tablet Orally BID    glyBURIDE-metformin 5-500 mg (GLUCOVANCE) 5-500 mg Tab Take 2 tablets by mouth 2 (two) times daily with meals.    hydrALAZINE (APRESOLINE) 100 MG tablet Take 1 tablet by mouth 2 (two) times daily.    hydrALAZINE (APRESOLINE) 50 MG tablet 1 tablet with food Orally Three times a day for 30 day(s)    hydrOXYchloroQUINE (PLAQUENIL) 200 mg tablet one tablet Orally twice a day for 30 days    insulin  unit/mL injection Inject into the skin 2 (two) times daily before meals.    insulin NPH-insulin regular, 70/30, (HUMULIN 70/30 U-100 INSULIN) 100 unit/mL (70-30) injection Inject 90 Units into the skin once daily.    ipratropium (ATROVENT) 0.02 % nebulizer solution Take 500 mcg by nebulization every 4 (four) hours as needed for Wheezing. Rescue:  One Unit every 4 Hr PRN    ipratropium-albuteroL (COMBIVENT RESPIMAT)  mcg/actuation inhaler Inhale 1 puff into the lungs 4 (four) times daily. Rescue    levothyroxine (SYNTHROID) 100 MCG tablet 1 tablet in the morning on an empty stomach Orally Once a day for 30 day(s)    levothyroxine (SYNTHROID) 50 MCG tablet Take 50 mcg by mouth.    losartan (COZAAR) 50 MG tablet Take 50 mg by mouth once daily.    metOLazone (ZAROXOLYN) 5 MG tablet 1 tablet Orally Once a day for 30 day(s)    metoprolol succinate (TOPROL-XL) 100 MG 24 hr tablet Take 100 mg by mouth once daily.    mometasone (ASMANEX  TWISTHALER) 220 mcg/ actuation (30) inhaler Inhale 1 puff into the lungs 2 (two) times a day. Controller    montelukast (SINGULAIR) 10 mg tablet Take 10 mg by mouth every evening.    montelukast (SINGULAIR) 10 mg tablet 1 tablet Orally Once a day for 30 day(s)    multivitamin (THERAGRAN) per tablet Take 1 tablet by mouth.    multivitamin with minerals tablet Take 1 tablet by mouth once daily.    mycophenolate (CELLCEPT) 500 mg Tab 1 tablet Orally Twice a day for 30 days    NIFEdipine (ADALAT CC) 60 MG TbSR 1 tablet on an empty stomach Orally Once a day for 30 day(s)    predniSONE (DELTASONE) 20 MG tablet 1 tablet as needed Orally Once a day for 30 day(s)    pregabalin (LYRICA) 75 MG capsule Take 75 mg by mouth.    rosuvastatin (CRESTOR) 10 MG tablet 1 tablet Orally Once a day for 30 day(s)    sevelamer carbonate (RENVELA) 800 mg Tab 1 tablet with meals Orally Three times a day for 30 day(s)    torsemide 40 mg Tab Take 40 mg by mouth.    traMADoL (ULTRAM) 50 mg tablet Take 50 mg by mouth 2 (two) times daily as needed for Pain.     Review of patient's allergies indicates:  No Known Allergies     Review of Systems   Respiratory:  Negative for cough and shortness of breath.    Cardiovascular:  Negative for chest pain, palpitations, orthopnea, claudication, leg swelling and PND.       Objective:      Vital Signs (Most Recent)       Vital Signs Range (Last 24H):       Physical Exam  Constitutional:       Appearance: She is obese.   Cardiovascular:      Rate and Rhythm: Normal rate and regular rhythm.      Pulses: Normal pulses.      Heart sounds: Normal heart sounds.   Pulmonary:      Effort: Pulmonary effort is normal.      Breath sounds: Normal breath sounds.         Data Review:  CBC:   Lab Results   Component Value Date    WBC 10.81 08/15/2023    RBC 3.39 (L) 08/15/2023    HGB 9.3 (L) 08/15/2023    HCT 30.6 (L) 08/15/2023     08/15/2023     BMP:   Lab Results   Component Value Date     (H) 08/15/2023      08/15/2023    K 4.0 08/15/2023    K 5.6 (H) 10/20/2022     (H) 08/15/2023    CO2 20 (L) 08/15/2023    BUN 35 (H) 08/15/2023    CREATININE 3.12 (H) 08/15/2023    CALCIUM 7.8 (L) 08/15/2023      ECG: normal sinus rhythm, no blocks or conduction defects, no ischemic changes.     Assessment:      There are no hospital problems to display for this patient.      Plan:    Right foot wound - 2 week wound, seen at [place]. Right DP palpable and right PT dopplerable. QUAN limited evaluation. Plan for peripheral angiography     DM - 7.4 AIC ,. Patient reports CKD - Cr 3.1    Anemia - ? CKD

## 2023-12-06 ENCOUNTER — OFFICE VISIT (OUTPATIENT)
Dept: OBSTETRICS AND GYNECOLOGY | Facility: CLINIC | Age: 43
End: 2023-12-06
Payer: COMMERCIAL

## 2023-12-06 ENCOUNTER — PROCEDURE VISIT (OUTPATIENT)
Dept: OBSTETRICS AND GYNECOLOGY | Facility: CLINIC | Age: 43
End: 2023-12-06
Payer: COMMERCIAL

## 2023-12-06 VITALS
SYSTOLIC BLOOD PRESSURE: 145 MMHG | BODY MASS INDEX: 68.04 KG/M2 | WEIGHT: 293 LBS | DIASTOLIC BLOOD PRESSURE: 87 MMHG | HEART RATE: 70 BPM

## 2023-12-06 DIAGNOSIS — N91.1 AMENORRHEA, SECONDARY: Primary | ICD-10-CM

## 2023-12-06 LAB
BASOPHILS # BLD AUTO: 0.09 K/UL (ref 0–0.2)
BASOPHILS NFR BLD AUTO: 1.1 % (ref 0–1)
DIFFERENTIAL METHOD BLD: ABNORMAL
EOSINOPHIL # BLD AUTO: 0.49 K/UL (ref 0–0.5)
EOSINOPHIL NFR BLD AUTO: 5.9 % (ref 1–4)
ERYTHROCYTE [DISTWIDTH] IN BLOOD BY AUTOMATED COUNT: 14.6 % (ref 11.5–14.5)
FSH SERPL-ACNC: 37.2 MIU/ML (ref 1.7–134.8)
HCT VFR BLD AUTO: 31.5 % (ref 38–47)
HGB BLD-MCNC: 10.2 G/DL (ref 12–16)
IMM GRANULOCYTES # BLD AUTO: 0.04 K/UL (ref 0–0.04)
IMM GRANULOCYTES NFR BLD: 0.5 % (ref 0–0.4)
LH SERPL-ACNC: 30.4 MIU/ML
LYMPHOCYTES # BLD AUTO: 1.06 K/UL (ref 1–4.8)
LYMPHOCYTES NFR BLD AUTO: 12.8 % (ref 27–41)
MCH RBC QN AUTO: 27.8 PG (ref 27–31)
MCHC RBC AUTO-ENTMCNC: 32.4 G/DL (ref 32–36)
MCV RBC AUTO: 85.8 FL (ref 80–96)
MONOCYTES # BLD AUTO: 0.69 K/UL (ref 0–0.8)
MONOCYTES NFR BLD AUTO: 8.3 % (ref 2–6)
MPC BLD CALC-MCNC: 10.8 FL (ref 9.4–12.4)
NEUTROPHILS # BLD AUTO: 5.94 K/UL (ref 1.8–7.7)
NEUTROPHILS NFR BLD AUTO: 71.4 % (ref 53–65)
NRBC # BLD AUTO: 0 X10E3/UL
NRBC, AUTO (.00): 0 %
PLATELET # BLD AUTO: 184 K/UL (ref 150–400)
RBC # BLD AUTO: 3.67 M/UL (ref 4.2–5.4)
T4 FREE SERPL-MCNC: 0.98 NG/DL (ref 0.76–1.46)
TSH SERPL DL<=0.005 MIU/L-ACNC: 2.14 UIU/ML (ref 0.36–3.74)
WBC # BLD AUTO: 8.31 K/UL (ref 4.5–11)

## 2023-12-06 PROCEDURE — 3008F PR BODY MASS INDEX (BMI) DOCUMENTED: ICD-10-PCS | Mod: ,,, | Performed by: OBSTETRICS & GYNECOLOGY

## 2023-12-06 PROCEDURE — 3079F DIAST BP 80-89 MM HG: CPT | Mod: ,,, | Performed by: OBSTETRICS & GYNECOLOGY

## 2023-12-06 PROCEDURE — 83002 LUTEINIZING HORMONE: ICD-10-PCS | Mod: ,,, | Performed by: CLINICAL MEDICAL LABORATORY

## 2023-12-06 PROCEDURE — 3079F PR MOST RECENT DIASTOLIC BLOOD PRESSURE 80-89 MM HG: ICD-10-PCS | Mod: ,,, | Performed by: OBSTETRICS & GYNECOLOGY

## 2023-12-06 PROCEDURE — 84439 THYROID PANEL: ICD-10-PCS | Mod: ,,, | Performed by: CLINICAL MEDICAL LABORATORY

## 2023-12-06 PROCEDURE — 85025 COMPLETE CBC W/AUTO DIFF WBC: CPT | Mod: ,,, | Performed by: CLINICAL MEDICAL LABORATORY

## 2023-12-06 PROCEDURE — 84443 ASSAY THYROID STIM HORMONE: CPT | Mod: ,,, | Performed by: CLINICAL MEDICAL LABORATORY

## 2023-12-06 PROCEDURE — 84439 ASSAY OF FREE THYROXINE: CPT | Mod: ,,, | Performed by: CLINICAL MEDICAL LABORATORY

## 2023-12-06 PROCEDURE — 36415 PR COLLECTION VENOUS BLOOD,VENIPUNCTURE: ICD-10-PCS | Mod: ,,, | Performed by: OBSTETRICS & GYNECOLOGY

## 2023-12-06 PROCEDURE — 3008F BODY MASS INDEX DOCD: CPT | Mod: ,,, | Performed by: OBSTETRICS & GYNECOLOGY

## 2023-12-06 PROCEDURE — 99499 NO LOS: ICD-10-PCS | Mod: ,,, | Performed by: OBSTETRICS & GYNECOLOGY

## 2023-12-06 PROCEDURE — 99204 PR OFFICE/OUTPT VISIT, NEW, LEVL IV, 45-59 MIN: ICD-10-PCS | Mod: ,,, | Performed by: OBSTETRICS & GYNECOLOGY

## 2023-12-06 PROCEDURE — 76856 PR  ECHO,PELVIC (NONOBSTETRIC): ICD-10-PCS | Mod: ,,, | Performed by: OBSTETRICS & GYNECOLOGY

## 2023-12-06 PROCEDURE — 83001 ASSAY OF GONADOTROPIN (FSH): CPT | Mod: ,,, | Performed by: CLINICAL MEDICAL LABORATORY

## 2023-12-06 PROCEDURE — 1159F PR MEDICATION LIST DOCUMENTED IN MEDICAL RECORD: ICD-10-PCS | Mod: ,,, | Performed by: OBSTETRICS & GYNECOLOGY

## 2023-12-06 PROCEDURE — 99499 UNLISTED E&M SERVICE: CPT | Mod: ,,, | Performed by: OBSTETRICS & GYNECOLOGY

## 2023-12-06 PROCEDURE — 3077F PR MOST RECENT SYSTOLIC BLOOD PRESSURE >= 140 MM HG: ICD-10-PCS | Mod: ,,, | Performed by: OBSTETRICS & GYNECOLOGY

## 2023-12-06 PROCEDURE — 83002 ASSAY OF GONADOTROPIN (LH): CPT | Mod: ,,, | Performed by: CLINICAL MEDICAL LABORATORY

## 2023-12-06 PROCEDURE — 3051F HG A1C>EQUAL 7.0%<8.0%: CPT | Mod: ,,, | Performed by: OBSTETRICS & GYNECOLOGY

## 2023-12-06 PROCEDURE — 84443 THYROID PANEL: ICD-10-PCS | Mod: ,,, | Performed by: CLINICAL MEDICAL LABORATORY

## 2023-12-06 PROCEDURE — 99204 OFFICE O/P NEW MOD 45 MIN: CPT | Mod: ,,, | Performed by: OBSTETRICS & GYNECOLOGY

## 2023-12-06 PROCEDURE — 83001 FOLLICLE STIMULATING HORMONE: ICD-10-PCS | Mod: ,,, | Performed by: CLINICAL MEDICAL LABORATORY

## 2023-12-06 PROCEDURE — 36415 COLL VENOUS BLD VENIPUNCTURE: CPT | Mod: ,,, | Performed by: OBSTETRICS & GYNECOLOGY

## 2023-12-06 PROCEDURE — 3051F PR MOST RECENT HEMOGLOBIN A1C LEVEL 7.0 - < 8.0%: ICD-10-PCS | Mod: ,,, | Performed by: OBSTETRICS & GYNECOLOGY

## 2023-12-06 PROCEDURE — 76856 US EXAM PELVIC COMPLETE: CPT | Mod: ,,, | Performed by: OBSTETRICS & GYNECOLOGY

## 2023-12-06 PROCEDURE — 1159F MED LIST DOCD IN RCRD: CPT | Mod: ,,, | Performed by: OBSTETRICS & GYNECOLOGY

## 2023-12-06 PROCEDURE — 3077F SYST BP >= 140 MM HG: CPT | Mod: ,,, | Performed by: OBSTETRICS & GYNECOLOGY

## 2023-12-06 PROCEDURE — 85025 CBC WITH DIFFERENTIAL: ICD-10-PCS | Mod: ,,, | Performed by: CLINICAL MEDICAL LABORATORY

## 2023-12-06 NOTE — PROGRESS NOTES
History & Physical    SUBJECTIVE:     History of Present Illness:  Patient is a 43 y.o. female presents with not having a cycle for years. Onset of symptoms was gradual starting a few years ago with unchanged course since that time.     Chief Complaint   Patient presents with    Absent Menses     Pt states it has been a few years since she had a cycle.       Review of patient's allergies indicates:  No Known Allergies    Current Outpatient Medications   Medication Sig Dispense Refill    albuterol (PROVENTIL/VENTOLIN HFA) 90 mcg/actuation inhaler Inhale 1 puff into the lungs.      albuterol sulfate (PROAIR RESPICLICK) 90 mcg/actuation inhaler Inhale 1 puff into the lungs every 4 (four) hours as needed.      amLODIPine (NORVASC) 10 MG tablet Take 10 mg by mouth.      carvediloL (COREG) 12.5 MG tablet 1 tablet with food Orally Twice a day for 30 day(s)      cholecalciferol, vitamin D3, 1,250 mcg (50,000 unit) capsule Take 50,000 Units by mouth.      empagliflozin (JARDIANCE) 10 mg tablet Take by mouth.      famotidine (PEPCID) 20 MG tablet 1 tablet at bedtime as needed Orally Once a day while on Prednisone for 30 day(s)      furosemide (LASIX) 40 MG tablet Take 40 mg by mouth.      furosemide (LASIX) 80 MG tablet 1 tablet Orally BID      glyBURIDE-metformin 5-500 mg (GLUCOVANCE) 5-500 mg Tab Take 2 tablets by mouth 2 (two) times daily with meals.      hydrOXYchloroQUINE (PLAQUENIL) 200 mg tablet one tablet Orally twice a day for 30 days      insulin  unit/mL injection Inject into the skin 2 (two) times daily before meals.      insulin NPH-insulin regular, 70/30, (HUMULIN 70/30 U-100 INSULIN) 100 unit/mL (70-30) injection Inject 90 Units into the skin once daily.      ipratropium (ATROVENT) 0.02 % nebulizer solution Take 500 mcg by nebulization every 4 (four) hours as needed for Wheezing. Rescue:  One Unit every 4 Hr PRN      ipratropium-albuteroL (COMBIVENT RESPIMAT)  mcg/actuation inhaler Inhale 1 puff  into the lungs 4 (four) times daily. Rescue      levothyroxine (SYNTHROID) 100 MCG tablet 1 tablet in the morning on an empty stomach Orally Once a day for 30 day(s)      levothyroxine (SYNTHROID) 50 MCG tablet Take 50 mcg by mouth.      losartan (COZAAR) 50 MG tablet Take 50 mg by mouth once daily.      metOLazone (ZAROXOLYN) 5 MG tablet 1 tablet Orally Once a day for 30 day(s)      metoprolol succinate (TOPROL-XL) 100 MG 24 hr tablet Take 100 mg by mouth once daily.      mometasone (ASMANEX TWISTHALER) 220 mcg/ actuation (30) inhaler Inhale 1 puff into the lungs 2 (two) times a day. Controller      montelukast (SINGULAIR) 10 mg tablet Take 10 mg by mouth every evening.      montelukast (SINGULAIR) 10 mg tablet 1 tablet Orally Once a day for 30 day(s)      multivitamin (THERAGRAN) per tablet Take 1 tablet by mouth.      multivitamin with minerals tablet Take 1 tablet by mouth once daily.      mycophenolate (CELLCEPT) 500 mg Tab 1 tablet Orally Twice a day for 30 days      NIFEdipine (ADALAT CC) 60 MG TbSR 1 tablet on an empty stomach Orally Once a day for 30 day(s)      predniSONE (DELTASONE) 20 MG tablet 1 tablet as needed Orally Once a day for 30 day(s)      pregabalin (LYRICA) 75 MG capsule Take 75 mg by mouth.      rosuvastatin (CRESTOR) 10 MG tablet 1 tablet Orally Once a day for 30 day(s)      sevelamer carbonate (RENVELA) 800 mg Tab 1 tablet with meals Orally Three times a day for 30 day(s)      traMADoL (ULTRAM) 50 mg tablet Take 50 mg by mouth 2 (two) times daily as needed for Pain.      hydrALAZINE (APRESOLINE) 100 MG tablet Take 1 tablet by mouth 2 (two) times daily.       No current facility-administered medications for this visit.       Past Medical History:   Diagnosis Date    Diabetes mellitus     Hypertension     Lupus nephritis     SOB (shortness of breath)     Wheeze      Past Surgical History:   Procedure Laterality Date    CATARACT EXTRACTION      PERIPHERAL ANGIOGRAPHY N/A 9/19/2023     Procedure: Peripheral angiography;  Surgeon: Napoleon Olson MD;  Location: [facility] CATH LAB;  Service: Cardiology;  Laterality: N/A;     Family History   Problem Relation Age of Onset    Diabetes Mother     Hypertension Mother     Hypertension Father     Diabetes Father      Social History     Tobacco Use    Smoking status: Never     Passive exposure: Never    Smokeless tobacco: Never   Substance Use Topics    Alcohol use: Yes     Comment: socially    Drug use: Never        Review of Systems:  Review of Systems   Constitutional:  Negative for appetite change, chills, fatigue and fever.   HENT: Negative.     Eyes: Negative.    Respiratory:  Negative for cough, chest tightness and shortness of breath.    Cardiovascular:  Negative for chest pain, palpitations and leg swelling.   Gastrointestinal:  Negative for abdominal distention, abdominal pain, blood in stool, constipation, diarrhea, nausea and vomiting.   Endocrine: Negative for cold intolerance, heat intolerance, polydipsia, polyphagia and polyuria.   Genitourinary:  Positive for menstrual problem (amenorrhea). Negative for difficulty urinating, dyspareunia, dysuria, flank pain, frequency, pelvic pain, urgency, vaginal bleeding, vaginal discharge and vaginal pain.   Musculoskeletal: Negative.    Skin: Negative.    Neurological: Negative.    Psychiatric/Behavioral: Negative.  Negative for agitation, behavioral problems, confusion and sleep disturbance. The patient is not nervous/anxious.        OBJECTIVE:     Vital Signs (Most Recent)  Pulse: 70 (12/06/23 1340)  BP: (!) 145/87 (12/06/23 1340)     (!) 168.7 kg (372 lb)     Physical Exam:  Physical Exam  Vitals reviewed. Exam conducted with a chaperone present.   Constitutional:       Appearance: Normal appearance.   HENT:      Head: Normocephalic and atraumatic.      Mouth/Throat:      Mouth: Mucous membranes are moist.   Eyes:      Extraocular Movements: Extraocular movements intact.      Pupils: Pupils are  equal, round, and reactive to light.   Cardiovascular:      Rate and Rhythm: Normal rate and regular rhythm.      Pulses: Normal pulses.      Heart sounds: Normal heart sounds.   Pulmonary:      Effort: Pulmonary effort is normal.      Breath sounds: Normal breath sounds.   Abdominal:      General: Abdomen is flat. Bowel sounds are normal.      Palpations: Abdomen is soft.   Musculoskeletal:         General: Normal range of motion.      Cervical back: Normal range of motion.   Skin:     General: Skin is warm and dry.   Neurological:      General: No focal deficit present.      Mental Status: She is alert and oriented to person, place, and time.   Psychiatric:         Mood and Affect: Mood normal.         Behavior: Behavior normal.         Thought Content: Thought content normal.         Judgment: Judgment normal.           ASSESSMENT/PLAN:   Panchito was seen today for absent menses.    Diagnoses and all orders for this visit:    Amenorrhea, secondary  -     Endometrial biopsy; Future  -     Hysteroscopy-Future; Future  -     Thyroid Panel; Future  -     Luteinizing Hormone; Future  -     Follicle Stimulating Hormone; Future  -     CBC Auto Differential; Future  -     US Pelvis Complete Non OB; Future  -     Thyroid Panel  -     Luteinizing Hormone  -     Follicle Stimulating Hormone  -     CBC Auto Differential        PLAN:Plan

## 2023-12-16 NOTE — PROCEDURES
GYN Ultrasound Note:    Uterus 6.80 x 3.36 x 2.95 cm  Endometrial thickness 0.63 cm    Ovaries not visualized bilaterally          Impression:  Hypoechoic area in the mid uterine segment measuring 1.8 x 1.2 cm  No free fluid visualized

## 2023-12-20 ENCOUNTER — PROCEDURE VISIT (OUTPATIENT)
Dept: OBSTETRICS AND GYNECOLOGY | Facility: CLINIC | Age: 43
End: 2023-12-20
Payer: COMMERCIAL

## 2023-12-20 VITALS
SYSTOLIC BLOOD PRESSURE: 179 MMHG | DIASTOLIC BLOOD PRESSURE: 113 MMHG | BODY MASS INDEX: 67.82 KG/M2 | HEART RATE: 71 BPM | WEIGHT: 293 LBS

## 2023-12-20 DIAGNOSIS — N91.1 AMENORRHEA, SECONDARY: Primary | ICD-10-CM

## 2023-12-20 LAB
B-HCG UR QL: NEGATIVE
CTP QC/QA: YES

## 2023-12-20 PROCEDURE — 81025 POCT URINE PREGNANCY: ICD-10-PCS | Mod: QW,,, | Performed by: OBSTETRICS & GYNECOLOGY

## 2023-12-20 PROCEDURE — 88305 TISSUE EXAM BY PATHOLOGIST: CPT | Mod: 26,,, | Performed by: PATHOLOGY

## 2023-12-20 PROCEDURE — 58558 HYSTEROSCOPY BIOPSY: CPT | Mod: ,,, | Performed by: OBSTETRICS & GYNECOLOGY

## 2023-12-20 PROCEDURE — 99499 UNLISTED E&M SERVICE: CPT | Mod: ,,, | Performed by: OBSTETRICS & GYNECOLOGY

## 2023-12-20 PROCEDURE — 88305 SURGICAL PATHOLOGY: ICD-10-PCS | Mod: 26,,, | Performed by: PATHOLOGY

## 2023-12-20 PROCEDURE — 99499 NO LOS: ICD-10-PCS | Mod: ,,, | Performed by: OBSTETRICS & GYNECOLOGY

## 2023-12-20 PROCEDURE — 58558 PR HYSTEROSCOPY,W/ENDO BX: ICD-10-PCS | Mod: ,,, | Performed by: OBSTETRICS & GYNECOLOGY

## 2023-12-20 PROCEDURE — 81025 URINE PREGNANCY TEST: CPT | Mod: QW,,, | Performed by: OBSTETRICS & GYNECOLOGY

## 2023-12-20 PROCEDURE — 88305 TISSUE EXAM BY PATHOLOGIST: CPT | Mod: TC,SUR | Performed by: OBSTETRICS & GYNECOLOGY

## 2023-12-21 NOTE — PROCEDURES
Procedures  Hysteorscopy, EMB Procedure Note:    Following informed written consent, the pt was placed in the lithotomy position. The speculum was inserted into the vagina and the cervix visualized with ease. The cervix was cleaned with betadine.  The uterus was sounded to 8  cm with the uterine sound. The hysteroscope was then inserted into the cervical os and the uterine cavity was directly visualized, bilateral ostea were noted at that time. Findings no lesions, polyps or fibroids seen. The hysteroscope was then removed and a biopsy of the endometrium performed with a pipelle. Specimen sent to pathology for evaluation. The single tooth tenaculum was then removed and the cervix was made hemostatic with pressure.

## 2023-12-22 LAB
ESTROGEN SERPL-MCNC: NORMAL PG/ML
INSULIN SERPL-ACNC: NORMAL U[IU]/ML
LAB AP CLINICAL INFORMATION: NORMAL
LAB AP GROSS DESCRIPTION: NORMAL
LAB AP LABORATORY NOTES: NORMAL
T3RU NFR SERPL: NORMAL %

## 2024-02-19 ENCOUNTER — OFFICE VISIT (OUTPATIENT)
Dept: OBSTETRICS AND GYNECOLOGY | Facility: CLINIC | Age: 44
End: 2024-02-19
Payer: COMMERCIAL

## 2024-02-19 VITALS
HEART RATE: 90 BPM | BODY MASS INDEX: 69.14 KG/M2 | SYSTOLIC BLOOD PRESSURE: 140 MMHG | WEIGHT: 293 LBS | DIASTOLIC BLOOD PRESSURE: 80 MMHG

## 2024-02-19 DIAGNOSIS — N91.1 AMENORRHEA, SECONDARY: Primary | ICD-10-CM

## 2024-02-19 PROCEDURE — 99214 OFFICE O/P EST MOD 30 MIN: CPT | Mod: ,,, | Performed by: OBSTETRICS & GYNECOLOGY

## 2024-02-19 PROCEDURE — 3079F DIAST BP 80-89 MM HG: CPT | Mod: ,,, | Performed by: OBSTETRICS & GYNECOLOGY

## 2024-02-19 PROCEDURE — 3008F BODY MASS INDEX DOCD: CPT | Mod: ,,, | Performed by: OBSTETRICS & GYNECOLOGY

## 2024-02-19 PROCEDURE — 3077F SYST BP >= 140 MM HG: CPT | Mod: ,,, | Performed by: OBSTETRICS & GYNECOLOGY

## 2024-02-19 PROCEDURE — 1159F MED LIST DOCD IN RCRD: CPT | Mod: ,,, | Performed by: OBSTETRICS & GYNECOLOGY

## 2024-02-19 RX ORDER — MEDROXYPROGESTERONE ACETATE 10 MG/1
10 TABLET ORAL DAILY
Qty: 10 TABLET | Refills: 11 | Status: SHIPPED | OUTPATIENT
Start: 2024-02-19 | End: 2025-02-18

## 2024-02-19 NOTE — PROGRESS NOTES
Subjective:      Patient ID: Panchito Wilson is a 44 y.o. female.    Chief Complaint:  Follow-up      History of Present Illness  HPI  Pt here to review EMB from 12/20/2023.    GYN & OB History  No LMP recorded. (Menstrual status: Other).   Date of Last Pap: No result found    OB History   No obstetric history on file.       Review of Systems  Review of Systems   Constitutional:  Negative for activity change, appetite change, fatigue and unexpected weight change.   HENT: Negative.     Respiratory:  Negative for cough, shortness of breath and wheezing.    Cardiovascular:  Negative for chest pain, palpitations and leg swelling.   Gastrointestinal:  Negative for abdominal pain, bloating, blood in stool, constipation, diarrhea, nausea, vomiting and reflux.   Genitourinary:  Positive for menstrual problem (amenorrhea). Negative for bladder incontinence, decreased libido, dysmenorrhea, dyspareunia, dysuria, flank pain, frequency, menorrhagia, pelvic pain, urgency, vaginal bleeding, vaginal discharge, postcoital bleeding and vaginal odor.   Musculoskeletal:  Negative for back pain.   Integumentary:  Negative for rash, acne, hair changes, mole/lesion, breast mass, nipple discharge, breast skin changes and breast tenderness.   Neurological:  Negative for headaches.   Psychiatric/Behavioral:  Negative for depression and sleep disturbance. The patient is not nervous/anxious.    Breast: Negative for asymmetry, breast self exam, lump, mass, nipple discharge, skin changes and tenderness         Objective:     Physical Exam:   Constitutional: She is oriented to person, place, and time. She appears well-developed and well-nourished.    HENT:   Head: Normocephalic and atraumatic.    Eyes: Pupils are equal, round, and reactive to light. EOM are normal.     Cardiovascular:  Normal rate, regular rhythm and normal heart sounds.             Pulmonary/Chest: Effort normal and breath sounds normal.        Abdominal: Soft. Bowel sounds are  normal.             Musculoskeletal: Normal range of motion and moves all extremeties.       Neurological: She is alert and oriented to person, place, and time. She has normal reflexes.     Psychiatric: She has a normal mood and affect. Her behavior is normal. Judgment and thought content normal.         Assessment:     1. Amenorrhea, secondary       Reviewed pathology results with pt:  HUGO. Endometrium, biopsy:  - Scant fragments of inactive endometrium and benign endocervical tissue        Plan:     Pt is to take Provera every month for 10 days to have a cycle and help regulate her cycles.

## 2024-05-20 ENCOUNTER — OFFICE VISIT (OUTPATIENT)
Dept: OBSTETRICS AND GYNECOLOGY | Facility: CLINIC | Age: 44
End: 2024-05-20
Payer: COMMERCIAL

## 2024-05-20 VITALS
HEART RATE: 63 BPM | BODY MASS INDEX: 67.09 KG/M2 | SYSTOLIC BLOOD PRESSURE: 157 MMHG | WEIGHT: 293 LBS | DIASTOLIC BLOOD PRESSURE: 95 MMHG

## 2024-05-20 DIAGNOSIS — N91.1 AMENORRHEA, SECONDARY: Primary | ICD-10-CM

## 2024-05-20 DIAGNOSIS — M32.9 LUPUS: ICD-10-CM

## 2024-05-20 DIAGNOSIS — E66.01 CLASS 3 SEVERE OBESITY WITH BODY MASS INDEX (BMI) OF 60.0 TO 69.9 IN ADULT, UNSPECIFIED OBESITY TYPE, UNSPECIFIED WHETHER SERIOUS COMORBIDITY PRESENT: ICD-10-CM

## 2024-05-20 PROCEDURE — 3052F HG A1C>EQUAL 8.0%<EQUAL 9.0%: CPT | Mod: ,,, | Performed by: OBSTETRICS & GYNECOLOGY

## 2024-05-20 PROCEDURE — 3008F BODY MASS INDEX DOCD: CPT | Mod: ,,, | Performed by: OBSTETRICS & GYNECOLOGY

## 2024-05-20 PROCEDURE — 99214 OFFICE O/P EST MOD 30 MIN: CPT | Mod: ,,, | Performed by: OBSTETRICS & GYNECOLOGY

## 2024-05-20 PROCEDURE — 3077F SYST BP >= 140 MM HG: CPT | Mod: ,,, | Performed by: OBSTETRICS & GYNECOLOGY

## 2024-05-20 PROCEDURE — 3080F DIAST BP >= 90 MM HG: CPT | Mod: ,,, | Performed by: OBSTETRICS & GYNECOLOGY

## 2024-05-20 PROCEDURE — 1159F MED LIST DOCD IN RCRD: CPT | Mod: ,,, | Performed by: OBSTETRICS & GYNECOLOGY

## 2024-05-20 NOTE — PROGRESS NOTES
Subjective     Patient ID: Panchito Wilson is a 44 y.o. female.    Chief Complaint:  Follow-up      History of Present Illness  HPI  Patient here to follow up from starting provera on 02/19/2024. Patient states she took the medication in March (10 days), and states she only had pink bleeding when wiping after using the restroom.  Patient also stats she had a kidney biopsy in April and did not take any medication that month. Patient states she has Lupus and her kidney function is only at 15%.    GYN & OB History  No LMP recorded. (Menstrual status: Other).   Date of Last Pap: No result found    OB History   No obstetric history on file.       Review of Systems  Review of Systems   Constitutional:  Negative for activity change, appetite change, fatigue and unexpected weight change.   HENT: Negative.     Respiratory:  Negative for cough, shortness of breath and wheezing.    Cardiovascular:  Negative for chest pain, palpitations and leg swelling.   Gastrointestinal:  Negative for abdominal pain, bloating, blood in stool, constipation, diarrhea, nausea, vomiting and reflux.   Genitourinary:  Positive for menstrual problem (amenorrhea). Negative for bladder incontinence, decreased libido, dysmenorrhea, dyspareunia, dysuria, flank pain, frequency, menorrhagia, pelvic pain, urgency, vaginal bleeding, vaginal discharge, postcoital bleeding and vaginal odor.   Musculoskeletal:  Negative for back pain.   Integumentary:  Negative for rash, acne, hair changes, mole/lesion, breast mass, nipple discharge, breast skin changes and breast tenderness.   Neurological:  Negative for headaches.   Psychiatric/Behavioral:  Negative for depression and sleep disturbance. The patient is not nervous/anxious.    Breast: Negative for asymmetry, breast self exam, lump, mass, nipple discharge, skin changes and tenderness         Objective   Physical Exam:   Constitutional: She is oriented to person, place, and time. She appears well-developed and  well-nourished.    HENT:   Head: Normocephalic and atraumatic.    Eyes: Pupils are equal, round, and reactive to light. EOM are normal.     Cardiovascular:  Normal rate, regular rhythm and normal heart sounds.             Pulmonary/Chest: Effort normal and breath sounds normal.        Abdominal: Soft. Bowel sounds are normal.             Musculoskeletal: Normal range of motion and moves all extremeties.       Neurological: She is alert and oriented to person, place, and time. She has normal reflexes.     Psychiatric: She has a normal mood and affect. Her behavior is normal. Judgment and thought content normal.            Assessment and Plan     1. Amenorrhea, secondary    2. Class 3 severe obesity with body mass index (BMI) of 60.0 to 69.9 in adult, unspecified obesity type, unspecified whether serious comorbidity present    3. Lupus            Plan:  Discontinue Provera  RTC in 1 year

## 2024-11-19 ENCOUNTER — PATIENT MESSAGE (OUTPATIENT)
Dept: GASTROENTEROLOGY | Facility: CLINIC | Age: 44
End: 2024-11-19

## 2024-12-27 ENCOUNTER — HOSPITAL ENCOUNTER (INPATIENT)
Facility: HOSPITAL | Age: 44
LOS: 1 days | Discharge: HOME HEALTH | DRG: 312 | End: 2024-12-29
Attending: HOSPITALIST | Admitting: HOSPITALIST
Payer: COMMERCIAL

## 2024-12-27 DIAGNOSIS — Z99.2 ESRD (END STAGE RENAL DISEASE) ON DIALYSIS: ICD-10-CM

## 2024-12-27 DIAGNOSIS — E66.9 DIABETES MELLITUS TYPE 2 IN OBESE: ICD-10-CM

## 2024-12-27 DIAGNOSIS — E78.2 MIXED HYPERLIPIDEMIA: ICD-10-CM

## 2024-12-27 DIAGNOSIS — M32.9 SYSTEMIC LUPUS ERYTHEMATOSUS, UNSPECIFIED SLE TYPE, UNSPECIFIED ORGAN INVOLVEMENT STATUS: ICD-10-CM

## 2024-12-27 DIAGNOSIS — E66.01 MORBID OBESITY WITH BMI OF 50.0-59.9, ADULT: ICD-10-CM

## 2024-12-27 DIAGNOSIS — R55 NEAR SYNCOPE: Primary | ICD-10-CM

## 2024-12-27 DIAGNOSIS — N18.6 ESRD (END STAGE RENAL DISEASE) ON DIALYSIS: ICD-10-CM

## 2024-12-27 DIAGNOSIS — E11.69 DIABETES MELLITUS TYPE 2 IN OBESE: ICD-10-CM

## 2024-12-27 DIAGNOSIS — G47.33 OSA (OBSTRUCTIVE SLEEP APNEA): ICD-10-CM

## 2024-12-27 LAB
ALBUMIN SERPL BCP-MCNC: 3 G/DL (ref 3.5–5)
ALBUMIN/GLOB SERPL: 0.6 {RATIO}
ALP SERPL-CCNC: 135 U/L (ref 40–150)
ALT SERPL W P-5'-P-CCNC: <7 U/L
ANION GAP SERPL CALCULATED.3IONS-SCNC: 13 MMOL/L (ref 7–16)
APTT PPP: 24.9 SECONDS (ref 25.2–37.3)
AST SERPL W P-5'-P-CCNC: 24 U/L (ref 5–34)
BILIRUB SERPL-MCNC: 0.5 MG/DL
BUN SERPL-MCNC: 7 MG/DL (ref 7–19)
BUN/CREAT SERPL: 3 (ref 6–20)
CALCIUM SERPL-MCNC: 8.5 MG/DL (ref 8.4–10.2)
CHLORIDE SERPL-SCNC: 93 MMOL/L (ref 98–107)
CO2 SERPL-SCNC: 30 MMOL/L (ref 22–29)
CREAT SERPL-MCNC: 2.8 MG/DL (ref 0.55–1.02)
EGFR (NO RACE VARIABLE) (RUSH/TITUS): 21 ML/MIN/1.73M2
EST. AVERAGE GLUCOSE BLD GHB EST-MCNC: 177 MG/DL
GLOBULIN SER-MCNC: 5 G/DL (ref 2–4)
GLUCOSE SERPL-MCNC: 253 MG/DL (ref 74–100)
HBA1C MFR BLD HPLC: 7.8 %
INFLUENZA A MOLECULAR (OHS): NEGATIVE
INFLUENZA B MOLECULAR (OHS): NEGATIVE
INR BLD: 0.99
LACTATE SERPL-SCNC: 1.9 MMOL/L (ref 0.5–2.2)
MAGNESIUM SERPL-MCNC: 1.8 MG/DL (ref 1.6–2.6)
PHOSPHATE SERPL-MCNC: 3 MG/DL (ref 2.3–4.7)
POTASSIUM SERPL-SCNC: 2.8 MMOL/L (ref 3.5–5.1)
PROT SERPL-MCNC: 8 G/DL (ref 6.4–8.3)
PROTHROMBIN TIME: 13 SECONDS (ref 11.7–14.7)
SARS-COV-2 RDRP RESP QL NAA+PROBE: NEGATIVE
SODIUM SERPL-SCNC: 133 MMOL/L (ref 136–145)
TROPONIN I SERPL HS-MCNC: 92.1 NG/L
TSH SERPL DL<=0.005 MIU/L-ACNC: 1.51 UIU/ML (ref 0.35–4.94)
VIT B12 SERPL-MCNC: 800 PG/ML (ref 213–816)

## 2024-12-27 PROCEDURE — 82607 VITAMIN B-12: CPT | Performed by: HOSPITALIST

## 2024-12-27 PROCEDURE — 87040 BLOOD CULTURE FOR BACTERIA: CPT | Performed by: HOSPITALIST

## 2024-12-27 PROCEDURE — 93010 ELECTROCARDIOGRAM REPORT: CPT | Mod: ,,, | Performed by: HOSPITALIST

## 2024-12-27 PROCEDURE — 84702 CHORIONIC GONADOTROPIN TEST: CPT | Performed by: HOSPITALIST

## 2024-12-27 PROCEDURE — 80053 COMPREHEN METABOLIC PANEL: CPT | Performed by: HOSPITALIST

## 2024-12-27 PROCEDURE — 83735 ASSAY OF MAGNESIUM: CPT | Performed by: HOSPITALIST

## 2024-12-27 PROCEDURE — 85610 PROTHROMBIN TIME: CPT | Performed by: HOSPITALIST

## 2024-12-27 PROCEDURE — 87635 SARS-COV-2 COVID-19 AMP PRB: CPT | Performed by: HOSPITALIST

## 2024-12-27 PROCEDURE — 93005 ELECTROCARDIOGRAM TRACING: CPT

## 2024-12-27 PROCEDURE — 84443 ASSAY THYROID STIM HORMONE: CPT | Performed by: HOSPITALIST

## 2024-12-27 PROCEDURE — 84100 ASSAY OF PHOSPHORUS: CPT | Performed by: HOSPITALIST

## 2024-12-27 PROCEDURE — 83036 HEMOGLOBIN GLYCOSYLATED A1C: CPT | Performed by: HOSPITALIST

## 2024-12-27 PROCEDURE — G0378 HOSPITAL OBSERVATION PER HR: HCPCS

## 2024-12-27 PROCEDURE — 99222 1ST HOSP IP/OBS MODERATE 55: CPT | Mod: AI,GC,, | Performed by: HOSPITALIST

## 2024-12-27 PROCEDURE — 36415 COLL VENOUS BLD VENIPUNCTURE: CPT | Mod: 91 | Performed by: HOSPITALIST

## 2024-12-27 PROCEDURE — G0379 DIRECT REFER HOSPITAL OBSERV: HCPCS

## 2024-12-27 PROCEDURE — 83605 ASSAY OF LACTIC ACID: CPT | Performed by: HOSPITALIST

## 2024-12-27 PROCEDURE — 87502 INFLUENZA DNA AMP PROBE: CPT | Performed by: HOSPITALIST

## 2024-12-27 PROCEDURE — 85730 THROMBOPLASTIN TIME PARTIAL: CPT | Performed by: HOSPITALIST

## 2024-12-27 PROCEDURE — 84484 ASSAY OF TROPONIN QUANT: CPT | Performed by: HOSPITALIST

## 2024-12-27 RX ORDER — IBUPROFEN 200 MG
24 TABLET ORAL
Status: DISCONTINUED | OUTPATIENT
Start: 2024-12-27 | End: 2024-12-29 | Stop reason: HOSPADM

## 2024-12-27 RX ORDER — ONDANSETRON HYDROCHLORIDE 2 MG/ML
8 INJECTION, SOLUTION INTRAVENOUS EVERY 6 HOURS PRN
Status: DISCONTINUED | OUTPATIENT
Start: 2024-12-27 | End: 2024-12-29 | Stop reason: HOSPADM

## 2024-12-27 RX ORDER — TRAZODONE HYDROCHLORIDE 50 MG/1
50 TABLET ORAL NIGHTLY PRN
Status: DISCONTINUED | OUTPATIENT
Start: 2024-12-27 | End: 2024-12-29 | Stop reason: HOSPADM

## 2024-12-27 RX ORDER — ACETAMINOPHEN 650 MG/1
650 SUPPOSITORY RECTAL EVERY 6 HOURS PRN
Status: DISCONTINUED | OUTPATIENT
Start: 2024-12-27 | End: 2024-12-29 | Stop reason: HOSPADM

## 2024-12-27 RX ORDER — ACETAMINOPHEN 500 MG
1000 TABLET ORAL EVERY 6 HOURS PRN
Status: DISCONTINUED | OUTPATIENT
Start: 2024-12-27 | End: 2024-12-29 | Stop reason: HOSPADM

## 2024-12-27 RX ORDER — IBUPROFEN 200 MG
16 TABLET ORAL
Status: DISCONTINUED | OUTPATIENT
Start: 2024-12-27 | End: 2024-12-29 | Stop reason: HOSPADM

## 2024-12-27 RX ORDER — TALC
6 POWDER (GRAM) TOPICAL NIGHTLY PRN
Status: DISCONTINUED | OUTPATIENT
Start: 2024-12-27 | End: 2024-12-29 | Stop reason: HOSPADM

## 2024-12-27 RX ORDER — DIPHENHYDRAMINE HCL 25 MG
50 CAPSULE ORAL EVERY 6 HOURS PRN
Status: DISCONTINUED | OUTPATIENT
Start: 2024-12-27 | End: 2024-12-29 | Stop reason: HOSPADM

## 2024-12-27 RX ORDER — GUAIFENESIN AND DEXTROMETHORPHAN HYDROBROMIDE 10; 100 MG/5ML; MG/5ML
10 SYRUP ORAL EVERY 6 HOURS PRN
Status: DISCONTINUED | OUTPATIENT
Start: 2024-12-27 | End: 2024-12-29 | Stop reason: HOSPADM

## 2024-12-27 RX ORDER — BISACODYL 5 MG
10 TABLET, DELAYED RELEASE (ENTERIC COATED) ORAL DAILY PRN
Status: DISCONTINUED | OUTPATIENT
Start: 2024-12-27 | End: 2024-12-29 | Stop reason: HOSPADM

## 2024-12-27 RX ORDER — ATORVASTATIN CALCIUM 40 MG/1
40 TABLET, FILM COATED ORAL NIGHTLY
Status: DISCONTINUED | OUTPATIENT
Start: 2024-12-28 | End: 2024-12-29 | Stop reason: HOSPADM

## 2024-12-27 RX ORDER — ALUMINUM HYDROXIDE, MAGNESIUM HYDROXIDE, AND SIMETHICONE 2400; 240; 2400 MG/30ML; MG/30ML; MG/30ML
30 SUSPENSION ORAL EVERY 6 HOURS PRN
Status: DISCONTINUED | OUTPATIENT
Start: 2024-12-27 | End: 2024-12-29 | Stop reason: HOSPADM

## 2024-12-27 RX ORDER — GLUCAGON 1 MG
1 KIT INJECTION
Status: DISCONTINUED | OUTPATIENT
Start: 2024-12-27 | End: 2024-12-29 | Stop reason: HOSPADM

## 2024-12-27 RX ORDER — DOCUSATE SODIUM 100 MG/1
100 CAPSULE, LIQUID FILLED ORAL 2 TIMES DAILY PRN
Status: DISCONTINUED | OUTPATIENT
Start: 2024-12-27 | End: 2024-12-29 | Stop reason: HOSPADM

## 2024-12-27 RX ORDER — INSULIN ASPART 100 [IU]/ML
0-10 INJECTION, SOLUTION INTRAVENOUS; SUBCUTANEOUS
Status: DISCONTINUED | OUTPATIENT
Start: 2024-12-27 | End: 2024-12-29 | Stop reason: HOSPADM

## 2024-12-28 PROBLEM — E66.01 MORBID OBESITY WITH BMI OF 50.0-59.9, ADULT: Status: ACTIVE | Noted: 2024-12-28

## 2024-12-28 LAB
ALBUMIN SERPL BCP-MCNC: 2.6 G/DL (ref 3.5–5)
ALBUMIN/GLOB SERPL: 0.6 {RATIO}
ALP SERPL-CCNC: 120 U/L (ref 40–150)
ALT SERPL W P-5'-P-CCNC: <7 U/L
ANION GAP SERPL CALCULATED.3IONS-SCNC: 13 MMOL/L (ref 7–16)
AST SERPL W P-5'-P-CCNC: 14 U/L (ref 5–34)
BASOPHILS # BLD AUTO: 0.08 K/UL (ref 0–0.2)
BASOPHILS NFR BLD AUTO: 0.9 % (ref 0–1)
BILIRUB SERPL-MCNC: 0.5 MG/DL
BUN SERPL-MCNC: 8 MG/DL (ref 7–19)
BUN/CREAT SERPL: 2 (ref 6–20)
CALCIUM SERPL-MCNC: 8.1 MG/DL (ref 8.4–10.2)
CHLORIDE SERPL-SCNC: 95 MMOL/L (ref 98–107)
CHOLEST SERPL-MCNC: 105 MG/DL
CHOLEST/HDLC SERPL: 6.6 {RATIO}
CO2 SERPL-SCNC: 30 MMOL/L (ref 22–29)
CREAT SERPL-MCNC: 3.47 MG/DL (ref 0.55–1.02)
DIFFERENTIAL METHOD BLD: ABNORMAL
EGFR (NO RACE VARIABLE) (RUSH/TITUS): 16 ML/MIN/1.73M2
EOSINOPHIL # BLD AUTO: 0.26 K/UL (ref 0–0.5)
EOSINOPHIL NFR BLD AUTO: 2.9 % (ref 1–4)
ERYTHROCYTE [DISTWIDTH] IN BLOOD BY AUTOMATED COUNT: 14.5 % (ref 11.5–14.5)
GLOBULIN SER-MCNC: 4.3 G/DL (ref 2–4)
GLUCOSE SERPL-MCNC: 173 MG/DL (ref 70–105)
GLUCOSE SERPL-MCNC: 177 MG/DL (ref 70–105)
GLUCOSE SERPL-MCNC: 221 MG/DL (ref 74–100)
GLUCOSE SERPL-MCNC: 252 MG/DL (ref 70–105)
GLUCOSE SERPL-MCNC: 297 MG/DL (ref 70–105)
HCT VFR BLD AUTO: 31.2 % (ref 38–47)
HDLC SERPL-MCNC: 16 MG/DL (ref 35–60)
HGB BLD-MCNC: 10.1 G/DL (ref 12–16)
IMM GRANULOCYTES # BLD AUTO: 0.06 K/UL (ref 0–0.04)
IMM GRANULOCYTES NFR BLD: 0.7 % (ref 0–0.4)
LDLC SERPL CALC-MCNC: 35 MG/DL
LDLC/HDLC SERPL: 2.2 {RATIO}
LYMPHOCYTES # BLD AUTO: 1.58 K/UL (ref 1–4.8)
LYMPHOCYTES NFR BLD AUTO: 17.8 % (ref 27–41)
MCH RBC QN AUTO: 29.5 PG (ref 27–31)
MCHC RBC AUTO-ENTMCNC: 32.4 G/DL (ref 32–36)
MCV RBC AUTO: 91.2 FL (ref 80–96)
MONOCYTES # BLD AUTO: 0.53 K/UL (ref 0–0.8)
MONOCYTES NFR BLD AUTO: 6 % (ref 2–6)
MPC BLD CALC-MCNC: 9.9 FL (ref 9.4–12.4)
NEUTROPHILS # BLD AUTO: 6.36 K/UL (ref 1.8–7.7)
NEUTROPHILS NFR BLD AUTO: 71.7 % (ref 53–65)
NONHDLC SERPL-MCNC: 89 MG/DL
NRBC # BLD AUTO: 0 X10E3/UL
NRBC, AUTO (.00): 0 %
PLATELET # BLD AUTO: 169 K/UL (ref 150–400)
POTASSIUM SERPL-SCNC: 3 MMOL/L (ref 3.5–5.1)
PROT SERPL-MCNC: 6.9 G/DL (ref 6.4–8.3)
RBC # BLD AUTO: 3.42 M/UL (ref 4.2–5.4)
SODIUM SERPL-SCNC: 135 MMOL/L (ref 136–145)
TRIGL SERPL-MCNC: 269 MG/DL (ref 37–140)
TROPONIN I SERPL HS-MCNC: 98.7 NG/L
VLDLC SERPL-MCNC: 54 MG/DL
WBC # BLD AUTO: 8.87 K/UL (ref 4.5–11)

## 2024-12-28 PROCEDURE — 85025 COMPLETE CBC W/AUTO DIFF WBC: CPT | Performed by: NURSE PRACTITIONER

## 2024-12-28 PROCEDURE — 80061 LIPID PANEL: CPT | Performed by: NURSE PRACTITIONER

## 2024-12-28 PROCEDURE — 25000003 PHARM REV CODE 250: Performed by: HOSPITALIST

## 2024-12-28 PROCEDURE — 11000001 HC ACUTE MED/SURG PRIVATE ROOM

## 2024-12-28 PROCEDURE — 84484 ASSAY OF TROPONIN QUANT: CPT | Performed by: NURSE PRACTITIONER

## 2024-12-28 PROCEDURE — G0378 HOSPITAL OBSERVATION PER HR: HCPCS

## 2024-12-28 PROCEDURE — 82962 GLUCOSE BLOOD TEST: CPT

## 2024-12-28 PROCEDURE — 80053 COMPREHEN METABOLIC PANEL: CPT | Performed by: NURSE PRACTITIONER

## 2024-12-28 PROCEDURE — 63600175 PHARM REV CODE 636 W HCPCS: Performed by: HOSPITALIST

## 2024-12-28 PROCEDURE — 36415 COLL VENOUS BLD VENIPUNCTURE: CPT | Performed by: NURSE PRACTITIONER

## 2024-12-28 PROCEDURE — 25000003 PHARM REV CODE 250: Performed by: NURSE PRACTITIONER

## 2024-12-28 PROCEDURE — 96374 THER/PROPH/DIAG INJ IV PUSH: CPT

## 2024-12-28 PROCEDURE — 99232 SBSQ HOSP IP/OBS MODERATE 35: CPT | Mod: ,,,

## 2024-12-28 RX ADMIN — INSULIN ASPART 4 UNITS: 100 INJECTION, SOLUTION INTRAVENOUS; SUBCUTANEOUS at 09:12

## 2024-12-28 RX ADMIN — ACETAMINOPHEN 1000 MG: 500 TABLET ORAL at 06:12

## 2024-12-28 RX ADMIN — ONDANSETRON 8 MG: 2 INJECTION INTRAMUSCULAR; INTRAVENOUS at 12:12

## 2024-12-28 RX ADMIN — ACETAMINOPHEN 1000 MG: 500 TABLET ORAL at 01:12

## 2024-12-28 RX ADMIN — ATORVASTATIN CALCIUM 40 MG: 40 TABLET, FILM COATED ORAL at 09:12

## 2024-12-28 NOTE — SUBJECTIVE & OBJECTIVE
Past Medical History:   Diagnosis Date    Asthma 05/20/2021    Diabetes mellitus type 2 in obese     ESRD (end stage renal disease) on dialysis 10/01/2024    Essential (primary) hypertension     Mixed hyperlipidemia     ROWDY (obstructive sleep apnea) 05/20/2021    SLE (systemic lupus erythematosus)        Past Surgical History:   Procedure Laterality Date    AV FISTULA PLACEMENT Left 11/01/2024    CATARACT EXTRACTION      PERIPHERAL ANGIOGRAPHY N/A 09/19/2023    Procedure: Peripheral angiography;  Surgeon: Napoleon Olson MD;  Location: [facility] CATH LAB;  Service: Cardiology;  Laterality: N/A;       Review of patient's allergies indicates:  No Known Allergies    No current facility-administered medications on file prior to encounter.     Current Outpatient Medications on File Prior to Encounter   Medication Sig    albuterol (PROVENTIL/VENTOLIN HFA) 90 mcg/actuation inhaler Inhale 1 puff into the lungs.    albuterol sulfate (PROAIR RESPICLICK) 90 mcg/actuation inhaler Inhale 1 puff into the lungs every 4 (four) hours as needed.    amLODIPine (NORVASC) 10 MG tablet Take 10 mg by mouth.    carvediloL (COREG) 12.5 MG tablet 1 tablet with food Orally Twice a day for 30 day(s)    cholecalciferol, vitamin D3, 1,250 mcg (50,000 unit) capsule Take 50,000 Units by mouth.    empagliflozin (JARDIANCE) 10 mg tablet Take by mouth.    famotidine (PEPCID) 20 MG tablet 1 tablet at bedtime as needed Orally Once a day while on Prednisone for 30 day(s)    furosemide (LASIX) 40 MG tablet Take 40 mg by mouth.    furosemide (LASIX) 80 MG tablet 1 tablet Orally BID    glyBURIDE-metformin 5-500 mg (GLUCOVANCE) 5-500 mg Tab Take 2 tablets by mouth 2 (two) times daily with meals.    hydrALAZINE (APRESOLINE) 100 MG tablet Take 1 tablet by mouth 2 (two) times daily.    hydrOXYchloroQUINE (PLAQUENIL) 200 mg tablet one tablet Orally twice a day for 30 days    insulin  unit/mL injection Inject into the skin 2 (two) times daily before  meals.    insulin NPH-insulin regular, 70/30, (HUMULIN 70/30 U-100 INSULIN) 100 unit/mL (70-30) injection Inject 90 Units into the skin once daily.    ipratropium (ATROVENT) 0.02 % nebulizer solution Take 500 mcg by nebulization every 4 (four) hours as needed for Wheezing. Rescue:  One Unit every 4 Hr PRN    ipratropium-albuteroL (COMBIVENT RESPIMAT)  mcg/actuation inhaler Inhale 1 puff into the lungs 4 (four) times daily. Rescue    levothyroxine (SYNTHROID) 100 MCG tablet 1 tablet in the morning on an empty stomach Orally Once a day for 30 day(s)    losartan (COZAAR) 50 MG tablet Take 50 mg by mouth once daily.    medroxyPROGESTERone (PROVERA) 10 MG tablet Take 1 tablet (10 mg total) by mouth once daily.    metOLazone (ZAROXOLYN) 5 MG tablet 1 tablet Orally Once a day for 30 day(s)    metoprolol succinate (TOPROL-XL) 100 MG 24 hr tablet Take 100 mg by mouth once daily.    mometasone (ASMANEX TWISTHALER) 220 mcg/ actuation (30) inhaler Inhale 1 puff into the lungs 2 (two) times a day. Controller    montelukast (SINGULAIR) 10 mg tablet Take 10 mg by mouth every evening.    montelukast (SINGULAIR) 10 mg tablet 1 tablet Orally Once a day for 30 day(s)    multivitamin (THERAGRAN) per tablet Take 1 tablet by mouth.    multivitamin with minerals tablet Take 1 tablet by mouth once daily.    mycophenolate (CELLCEPT) 500 mg Tab 1 tablet Orally Twice a day for 30 days    NIFEdipine (ADALAT CC) 60 MG TbSR 1 tablet on an empty stomach Orally Once a day for 30 day(s)    predniSONE (DELTASONE) 20 MG tablet 1 tablet as needed Orally Once a day for 30 day(s)    pregabalin (LYRICA) 75 MG capsule Take 75 mg by mouth.    rosuvastatin (CRESTOR) 10 MG tablet 1 tablet Orally Once a day for 30 day(s)    sevelamer carbonate (RENVELA) 800 mg Tab 1 tablet with meals Orally Three times a day for 30 day(s)    traMADoL (ULTRAM) 50 mg tablet Take 50 mg by mouth 2 (two) times daily as needed for Pain.     Family History       Problem  Relation (Age of Onset)    Diabetes Mother, Father    Hypertension Mother, Father          Tobacco Use    Smoking status: Never     Passive exposure: Never    Smokeless tobacco: Never   Substance and Sexual Activity    Alcohol use: Yes     Comment: socially    Drug use: Never    Sexual activity: Not Currently     Review of Systems   Constitutional:  Negative for activity change, chills and fever.   HENT:  Negative for trouble swallowing.    Eyes:  Negative for visual disturbance.   Respiratory:  Negative for cough, chest tightness, shortness of breath and wheezing.    Cardiovascular:  Negative for chest pain, palpitations and leg swelling.   Gastrointestinal:  Positive for nausea and vomiting. Negative for abdominal pain, constipation and diarrhea.   Genitourinary:  Negative for flank pain, frequency and hematuria.   Musculoskeletal:  Negative for arthralgias, back pain and neck pain.   Skin:  Negative for rash and wound.   Neurological:  Positive for weakness and light-headedness. Negative for seizures and headaches.     Objective:     Vital Signs (Most Recent):    Vital Signs (24h Range):  Temp:  [98.2 °F (36.8 °C)] 98.2 °F (36.8 °C)  Pulse:  [65] 65  Resp:  [18] 18  SpO2:  [92 %] 92 %  BP: (105)/(51) 105/51        There is no height or weight on file to calculate BMI.     Physical Exam  Constitutional:       General: She is not in acute distress.     Appearance: She is not ill-appearing, toxic-appearing or diaphoretic.   HENT:      Head: Normocephalic and atraumatic.      Nose: No congestion or rhinorrhea.      Mouth/Throat:      Mouth: Mucous membranes are moist.      Pharynx: Oropharynx is clear. No oropharyngeal exudate or posterior oropharyngeal erythema.   Eyes:      Pupils: Pupils are equal, round, and reactive to light.   Cardiovascular:      Rate and Rhythm: Normal rate and regular rhythm.      Pulses: Normal pulses.      Heart sounds: Normal heart sounds. No murmur heard.     No friction rub.       Comments: There is a Right chest wall Dailysis catheter with no drainage, redness/tenderness. There is a left upper arm AV fistula with a palpable thrill, no erythema or tenderness at site  Pulmonary:      Effort: Pulmonary effort is normal.      Breath sounds: Normal breath sounds. No wheezing, rhonchi or rales.   Abdominal:      General: Abdomen is flat. Bowel sounds are normal.      Palpations: Abdomen is soft.   Musculoskeletal:         General: No swelling.   Skin:     General: Skin is warm and dry.      Capillary Refill: Capillary refill takes less than 2 seconds.   Neurological:      General: No focal deficit present.      Mental Status: She is alert and oriented to person, place, and time.      Sensory: No sensory deficit.      Motor: No weakness.   Psychiatric:         Mood and Affect: Mood normal.         Behavior: Behavior normal.         Thought Content: Thought content normal.              CRANIAL NERVES     CN III, IV, VI   Pupils are equal, round, and reactive to light.       Significant Labs: All pertinent labs within the past 24 hours have been reviewed.    Significant Imaging: I have reviewed all pertinent imaging results/findings within the past 24 hours.

## 2024-12-28 NOTE — ASSESSMENT & PLAN NOTE
Tolu had two episodes of dizziness witthout C/P, SOB or palpitations today. While on dialysis its reported Pt became hypotensive. Pt completed dialysis. On her way home she felt light headed/weak again. Pt denies LOC this may be secondary to dehydration, electrolytes, Cardiogenic.   Pt received 500 CC LR bolus in ED    Last echo 11/7/24     Left Ventricle: Severely increased wall thickness. Normal (55 - 60%)   ejection fraction. Grade II (moderate) left ventricular diastolic   dysfunction. Abnormal septal motion. Systolic and diastolic flattening of   the interventricular septum consistent with right ventricle pressure and   volume overload.     Right Ventricle: The right ventricular cavity size is moderately   dilated. Mildly to moderately reduced systolic function.     Tricuspid Valve: Moderate to severe tricuspid regurgitation. No   stenosis. RVSP is 69.00 mmHg.     IVC/SVC: Elevated central venous pressure (15-20 mm Hg).     -telemetry  -trend troponin  -Echo in AM  -carotid doppler  Strict I/O  -Orthostatic VS  -  12/28- continuing work up, echo read pending

## 2024-12-28 NOTE — PLAN OF CARE
Problem: Adult Inpatient Plan of Care  Goal: Plan of Care Review  12/28/2024 0351 by Shannan Escalante RN  Outcome: Progressing  12/27/2024 2321 by Shannan Escalante RN  Outcome: Progressing  12/27/2024 2319 by Shannan Escalante RN  Flowsheets (Taken 12/27/2024 2319)  Plan of Care Reviewed With: patient  Goal: Patient-Specific Goal (Individualized)  12/28/2024 0351 by Shannan Escalante RN  Outcome: Progressing  12/27/2024 2321 by Shannan Escalante RN  Outcome: Progressing  Goal: Absence of Hospital-Acquired Illness or Injury  12/28/2024 0351 by Shannan Escalante RN  Outcome: Progressing  12/27/2024 2321 by Shannan Escalante RN  Outcome: Progressing  Intervention: Identify and Manage Fall Risk  Flowsheets (Taken 12/27/2024 2319)  Safety Promotion/Fall Prevention:   assistive device/personal item within reach   medications reviewed   side rails raised x 2  Intervention: Prevent Skin Injury  Flowsheets (Taken 12/27/2024 2319)  Body Position: position changed independently  Device Skin Pressure Protection: absorbent pad utilized/changed  Intervention: Prevent Infection  Flowsheets (Taken 12/27/2024 2319)  Infection Prevention:   cohorting utilized   single patient room provided   equipment surfaces disinfected  Goal: Optimal Comfort and Wellbeing  12/28/2024 0351 by Shannan Escalante RN  Outcome: Progressing  12/27/2024 2321 by Shannan Escalante RN  Outcome: Progressing  Goal: Readiness for Transition of Care  12/28/2024 0351 by Shannan Escalante RN  Outcome: Progressing  12/27/2024 2321 by Shannan Escalante RN  Outcome: Progressing     Problem: Infection  Goal: Absence of Infection Signs and Symptoms  12/28/2024 0351 by Shannan Escalante RN  Outcome: Progressing  12/27/2024 2321 by Shannan Escalante RN  Outcome: Progressing     Problem: Skin Injury Risk Increased  Goal: Skin Health and Integrity  12/28/2024 0351 by Shannan Escalante RN  Outcome: Progressing  12/27/2024 2321 by Shannan Escalante RN  Outcome: Progressing     Problem:  Diabetes Comorbidity  Goal: Blood Glucose Level Within Targeted Range  Outcome: Progressing     Problem: Bariatric Environmental Safety  Goal: Safety Maintained with Care  Outcome: Progressing

## 2024-12-28 NOTE — HPI
44 y.o. female with PMH significant for ESRD on HD, anemia of CKD, obesity, history of lupus, hypertension and diabetes who presented to [facility] for a complaint of Light headedness/weakness. Pt reports not sleeping well and experienced nausea and vomiting around 3 AM, likely exacerbated by cleaning and changing a bandage, leading to a dry mouth and a bad taste. The patient attended dialysis today, during which she felt lightheaded. The dialysis team noted a drop in blood pressure and decided not to pull fluid but only filtered the blood. The patient reported feeling better after dialysis and consumed food without further issues. However, upon driving home, the patient experienced lightheadedness/weakness again and decided to go to the hospital to avoid the risk of falling. The patient did not report chest pain or shortness of breath or palpitations.     Presenting Vitals at [facility] were /51, HR 65, SpO2 92% temp 36.8°.  She lifted findings were hemoglobin 10.3, hematocrit 32.2.  Sodium 134 potassium 2.8, glucose 287.  Troponin was found to be less than 0.05.  ProBNP is elevated at 7 504.8.  EKG shows a right bundle branch block with no ST elevation or depression rate of 61.  While in the ED at [facility] patient received a 500 cc lactated ringer bolus.    Patient will be admitted to observation under the direct supervision of Dr. Obrien for further evaluation and management.     We will place patient on telemetry, start potassium replacement conservatively.Check a magnesium, phosphorus and troponin I and get a baseline EKG.

## 2024-12-28 NOTE — HOSPITAL COURSE
12/28- continues to complain of lightheadedness, will check orthostatics as well as continued work up, states she is planned for dialysis tomorrow due to holiday schedule so will consult nephro to continue and likely discharge tomorrow   12/29- lightheadedness resolved today, orthostatics negative, dialysis not available today, confirmed she does have a seat for 6 am tomorrow. Thorough review of medications today as patient had many duplicate and multiple medications. Suspect this was cause along with dialysis causing symptoms. Ok to discharge today

## 2024-12-28 NOTE — H&P
[facility] Tele[facility] Hospital Medicine  History & Physical    Patient Name: Panchito Wilson  MRN: [id number]  Patient Class: OP- Observation  Admission Date: 12/27/2024  Attending Physician: Marion Antoine MD   Primary Care Provider: Breana Terrell DO         Patient information was obtained from patient, past medical records, and ER records.     Subjective:     Principal Problem:<principal problem not specified>    Chief Complaint:   Chief Complaint   Patient presents with    Near Syncope        HPI: 44 y.o. female with PMH significant for ESRD on HD, anemia of CKD, obesity, history of lupus, hypertension and diabetes who presented to [facility] for a complaint of Light headedness/weakness. Pt reports not sleeping well and experienced nausea and vomiting around 3 AM, likely exacerbated by cleaning and changing a bandage, leading to a dry mouth and a bad taste. The patient attended dialysis today, during which she felt lightheaded. The dialysis team noted a drop in blood pressure and decided not to pull fluid but only filtered the blood. The patient reported feeling better after dialysis and consumed food without further issues. However, upon driving home, the patient experienced lightheadedness/weakness again and decided to go to the hospital to avoid the risk of falling. The patient did not report chest pain or shortness of breath or palpitations.     Presenting Vitals at [facility] were /51, HR 65, SpO2 92% temp 36.8°.  She lifted findings were hemoglobin 10.3, hematocrit 32.2.  Sodium 134 potassium 2.8, glucose 287.  Troponin was found to be less than 0.05.  ProBNP is elevated at 7 504.8.  EKG shows a right bundle branch block with no ST elevation or depression rate of 61.  While in the ED at [facility] patient received a 500 cc lactated ringer bolus.    Patient will be admitted to observation under the direct supervision of Dr. Obrien for further evaluation and management.     We will place  patient on telemetry, start potassium replacement conservatively.Check a magnesium, phosphorus and troponin I and get a baseline EKG.    Past Medical History:   Diagnosis Date    Asthma 05/20/2021    Diabetes mellitus type 2 in obese     ESRD (end stage renal disease) on dialysis 10/01/2024    Essential (primary) hypertension     Mixed hyperlipidemia     ROWDY (obstructive sleep apnea) 05/20/2021    SLE (systemic lupus erythematosus)        Past Surgical History:   Procedure Laterality Date    AV FISTULA PLACEMENT Left 11/01/2024    CATARACT EXTRACTION      PERIPHERAL ANGIOGRAPHY N/A 09/19/2023    Procedure: Peripheral angiography;  Surgeon: Napoleon Olson MD;  Location: [facility] CATH LAB;  Service: Cardiology;  Laterality: N/A;       Review of patient's allergies indicates:  No Known Allergies    No current facility-administered medications on file prior to encounter.     Current Outpatient Medications on File Prior to Encounter   Medication Sig    albuterol (PROVENTIL/VENTOLIN HFA) 90 mcg/actuation inhaler Inhale 1 puff into the lungs.    albuterol sulfate (PROAIR RESPICLICK) 90 mcg/actuation inhaler Inhale 1 puff into the lungs every 4 (four) hours as needed.    amLODIPine (NORVASC) 10 MG tablet Take 10 mg by mouth.    carvediloL (COREG) 12.5 MG tablet 1 tablet with food Orally Twice a day for 30 day(s)    cholecalciferol, vitamin D3, 1,250 mcg (50,000 unit) capsule Take 50,000 Units by mouth.    empagliflozin (JARDIANCE) 10 mg tablet Take by mouth.    famotidine (PEPCID) 20 MG tablet 1 tablet at bedtime as needed Orally Once a day while on Prednisone for 30 day(s)    furosemide (LASIX) 40 MG tablet Take 40 mg by mouth.    furosemide (LASIX) 80 MG tablet 1 tablet Orally BID    glyBURIDE-metformin 5-500 mg (GLUCOVANCE) 5-500 mg Tab Take 2 tablets by mouth 2 (two) times daily with meals.    hydrALAZINE (APRESOLINE) 100 MG tablet Take 1 tablet by mouth 2 (two) times daily.    hydrOXYchloroQUINE (PLAQUENIL) 200  mg tablet one tablet Orally twice a day for 30 days    insulin  unit/mL injection Inject into the skin 2 (two) times daily before meals.    insulin NPH-insulin regular, 70/30, (HUMULIN 70/30 U-100 INSULIN) 100 unit/mL (70-30) injection Inject 90 Units into the skin once daily.    ipratropium (ATROVENT) 0.02 % nebulizer solution Take 500 mcg by nebulization every 4 (four) hours as needed for Wheezing. Rescue:  One Unit every 4 Hr PRN    ipratropium-albuteroL (COMBIVENT RESPIMAT)  mcg/actuation inhaler Inhale 1 puff into the lungs 4 (four) times daily. Rescue    levothyroxine (SYNTHROID) 100 MCG tablet 1 tablet in the morning on an empty stomach Orally Once a day for 30 day(s)    losartan (COZAAR) 50 MG tablet Take 50 mg by mouth once daily.    medroxyPROGESTERone (PROVERA) 10 MG tablet Take 1 tablet (10 mg total) by mouth once daily.    metOLazone (ZAROXOLYN) 5 MG tablet 1 tablet Orally Once a day for 30 day(s)    metoprolol succinate (TOPROL-XL) 100 MG 24 hr tablet Take 100 mg by mouth once daily.    mometasone (ASMANEX TWISTHALER) 220 mcg/ actuation (30) inhaler Inhale 1 puff into the lungs 2 (two) times a day. Controller    montelukast (SINGULAIR) 10 mg tablet Take 10 mg by mouth every evening.    montelukast (SINGULAIR) 10 mg tablet 1 tablet Orally Once a day for 30 day(s)    multivitamin (THERAGRAN) per tablet Take 1 tablet by mouth.    multivitamin with minerals tablet Take 1 tablet by mouth once daily.    mycophenolate (CELLCEPT) 500 mg Tab 1 tablet Orally Twice a day for 30 days    NIFEdipine (ADALAT CC) 60 MG TbSR 1 tablet on an empty stomach Orally Once a day for 30 day(s)    predniSONE (DELTASONE) 20 MG tablet 1 tablet as needed Orally Once a day for 30 day(s)    pregabalin (LYRICA) 75 MG capsule Take 75 mg by mouth.    rosuvastatin (CRESTOR) 10 MG tablet 1 tablet Orally Once a day for 30 day(s)    sevelamer carbonate (RENVELA) 800 mg Tab 1 tablet with meals Orally Three times a day for 30  day(s)    traMADoL (ULTRAM) 50 mg tablet Take 50 mg by mouth 2 (two) times daily as needed for Pain.     Family History       Problem Relation (Age of Onset)    Diabetes Mother, Father    Hypertension Mother, Father          Tobacco Use    Smoking status: Never     Passive exposure: Never    Smokeless tobacco: Never   Substance and Sexual Activity    Alcohol use: Yes     Comment: socially    Drug use: Never    Sexual activity: Not Currently     Review of Systems   Constitutional:  Negative for activity change, chills and fever.   HENT:  Negative for trouble swallowing.    Eyes:  Negative for visual disturbance.   Respiratory:  Negative for cough, chest tightness, shortness of breath and wheezing.    Cardiovascular:  Negative for chest pain, palpitations and leg swelling.   Gastrointestinal:  Positive for nausea and vomiting. Negative for abdominal pain, constipation and diarrhea.   Genitourinary:  Negative for flank pain, frequency and hematuria.   Musculoskeletal:  Negative for arthralgias, back pain and neck pain.   Skin:  Negative for rash and wound.   Neurological:  Positive for weakness and light-headedness. Negative for seizures and headaches.     Objective:     Vital Signs (Most Recent):    Vital Signs (24h Range):  Temp:  [98.2 °F (36.8 °C)] 98.2 °F (36.8 °C)  Pulse:  [65] 65  Resp:  [18] 18  SpO2:  [92 %] 92 %  BP: (105)/(51) 105/51        There is no height or weight on file to calculate BMI.     Physical Exam  Constitutional:       General: She is not in acute distress.     Appearance: She is not ill-appearing, toxic-appearing or diaphoretic.   HENT:      Head: Normocephalic and atraumatic.      Nose: No congestion or rhinorrhea.      Mouth/Throat:      Mouth: Mucous membranes are moist.      Pharynx: Oropharynx is clear. No oropharyngeal exudate or posterior oropharyngeal erythema.   Eyes:      Pupils: Pupils are equal, round, and reactive to light.   Cardiovascular:      Rate and Rhythm: Normal rate and  regular rhythm.      Pulses: Normal pulses.      Heart sounds: Normal heart sounds. No murmur heard.     No friction rub.      Comments: There is a Right chest wall Dailysis catheter with no drainage, redness/tenderness. There is a left upper arm AV fistula with a palpable thrill, no erythema or tenderness at site  Pulmonary:      Effort: Pulmonary effort is normal.      Breath sounds: Normal breath sounds. No wheezing, rhonchi or rales.   Abdominal:      General: Abdomen is flat. Bowel sounds are normal.      Palpations: Abdomen is soft.   Musculoskeletal:         General: No swelling.   Skin:     General: Skin is warm and dry.      Capillary Refill: Capillary refill takes less than 2 seconds.   Neurological:      General: No focal deficit present.      Mental Status: She is alert and oriented to person, place, and time.      Sensory: No sensory deficit.      Motor: No weakness.   Psychiatric:         Mood and Affect: Mood normal.         Behavior: Behavior normal.         Thought Content: Thought content normal.              CRANIAL NERVES     CN III, IV, VI   Pupils are equal, round, and reactive to light.       Significant Labs: All pertinent labs within the past 24 hours have been reviewed.    Significant Imaging: I have reviewed all pertinent imaging results/findings within the past 24 hours.  Assessment/Plan:     Near syncope  Patietn had two episodes of dizziness witthout C/P, SOB or palpitations today. While on dialysis its reported Pt became hypotensive. Pt completed dialysis. On her way home she felt light headed/weak again. Pt denies LOC this may be secondary to dehydration, electrolytes, Cardiogenic.   Pt received 500 CC LR bolus in ED    Last echo 11/7/24     Left Ventricle: Severely increased wall thickness. Normal (55 - 60%)   ejection fraction. Grade II (moderate) left ventricular diastolic   dysfunction. Abnormal septal motion. Systolic and diastolic flattening of   the interventricular septum  "consistent with right ventricle pressure and   volume overload.     Right Ventricle: The right ventricular cavity size is moderately   dilated. Mildly to moderately reduced systolic function.     Tricuspid Valve: Moderate to severe tricuspid regurgitation. No   stenosis. RVSP is 69.00 mmHg.     IVC/SVC: Elevated central venous pressure (15-20 mm Hg).     -telemetry  -trend troponin  -Echo in AM  -carotid doppler  Strict I/O  -Orthostatic VS      SLE (systemic lupus erythematosus)  HX of SLE Followed by Rheumatology in [place] Dr Garcia  Was on prednisone but weaned off several months ago, Pt not taking prescribed Hydroxychloroquine because it makes her feel achy.  Pt has increased risk af athersclerosis  -lipid panel in AM      Diabetes mellitus type 2 in obese  Patient's FSGs are uncontrolled due to hyperglycemia on current medication regimen.  Last A1c reviewed-   Lab Results   Component Value Date    HGBA1C 7.8 (H) 12/27/2024     Most recent fingerstick glucose reviewed- No results for input(s): "POCTGLUCOSE" in the last 24 hours.  Current correctional scale  Medium  Increase anti-hyperglycemic dose as follows-   Antihyperglycemics (From admission, onward)      Start     Stop Route Frequency Ordered    12/27/24 2159  insulin aspart U-100 injection 0-10 Units         -- SubQ Before meals & nightly PRN 12/27/24 2059          Hold Oral hypoglycemics while patient is in the hospital.    ESRD (end stage renal disease) on dialysis  Creatine stable for now. BMP reviewed- noted  Based on current GFR, CKD stage is end stage.  Monitor UOP and serial BMP and adjust therapy as needed. Renally dose meds. Avoid nephrotoxic medications and procedures.  Pt does Dialysis MWF, Completed today    Mixed hyperlipidemia  -atorvastatin 40 mg QHS      ROWDY (obstructive sleep apnea)  Pt diagnosed approximately 3 mos ago. Currently not on CPAP  Monitor closely  telemetry        VTE Risk Mitigation (From admission, onward)           Ordered "     IP VTE HIGH RISK PATIENT  Once         12/27/24 2327     Place SAUL hose  Until discontinued         12/27/24 2327                           On 12/27/2024, patient should be placed in hospital observation services under my care in collaboration with Dr Obrien.         Mariam Dacosta MD  Department of Hospital Medicine  [facility] - [facility] Telemetry

## 2024-12-28 NOTE — PROGRESS NOTES
[facility] - [facility] Telemetry Hospital Medicine  Progress Note    Patient Name: Panchito Wilson  MRN: [id number]  Patient Class: IP- Inpatient   Admission Date: 12/27/2024  Length of Stay: 0 days  Attending Physician: Marion Anotine MD  Primary Care Provider: Breana Terrell DO        Subjective     Principal Problem:Near syncope        HPI:  44 y.o. female with PMH significant for ESRD on HD, anemia of CKD, obesity, history of lupus, hypertension and diabetes who presented to [facility] for a complaint of Light headedness/weakness. Pt reports not sleeping well and experienced nausea and vomiting around 3 AM, likely exacerbated by cleaning and changing a bandage, leading to a dry mouth and a bad taste. The patient attended dialysis today, during which she felt lightheaded. The dialysis team noted a drop in blood pressure and decided not to pull fluid but only filtered the blood. The patient reported feeling better after dialysis and consumed food without further issues. However, upon driving home, the patient experienced lightheadedness/weakness again and decided to go to the hospital to avoid the risk of falling. The patient did not report chest pain or shortness of breath or palpitations.     Presenting Vitals at [facility] were /51, HR 65, SpO2 92% temp 36.8°.  She lifted findings were hemoglobin 10.3, hematocrit 32.2.  Sodium 134 potassium 2.8, glucose 287.  Troponin was found to be less than 0.05.  ProBNP is elevated at 7 504.8.  EKG shows a right bundle branch block with no ST elevation or depression rate of 61.  While in the ED at [facility] patient received a 500 cc lactated ringer bolus.    Patient will be admitted to observation under the direct supervision of Dr. Obrien for further evaluation and management.     We will place patient on telemetry, start potassium replacement conservatively.Check a magnesium, phosphorus and troponin I and get a baseline EKG.    Overview/Hospital Course:  12/28-  continues to complain of lightheadedness, will check orthostatics as well as continued work up, states she is planned for dialysis tomorrow due to holiday schedule so will consult nephro to continue and likely discharge tomorrow     Interval History:    Review of Systems   Constitutional:  Negative for activity change, chills and fever.   HENT:  Negative for trouble swallowing.    Eyes:  Negative for visual disturbance.   Respiratory:  Negative for cough, chest tightness, shortness of breath and wheezing.    Cardiovascular:  Negative for chest pain, palpitations and leg swelling.   Gastrointestinal:  Positive for nausea and vomiting. Negative for abdominal pain, constipation and diarrhea.   Genitourinary:  Negative for flank pain, frequency and hematuria.   Musculoskeletal:  Negative for arthralgias, back pain and neck pain.   Skin:  Negative for rash and wound.   Neurological:  Positive for weakness and light-headedness. Negative for seizures and headaches.     Objective:     Vital Signs (Most Recent):  Temp: 98.6 °F (37 °C) (12/28/24 1540)  Pulse: 61 (12/28/24 1540)  Resp: 20 (12/28/24 1540)  BP: 138/67 (12/28/24 1540)  SpO2: (!) 93 % (12/28/24 1540) Vital Signs (24h Range):  Temp:  [97.8 °F (36.6 °C)-98.6 °F (37 °C)] 98.6 °F (37 °C)  Pulse:  [56-82] 61  Resp:  [16-20] 20  SpO2:  [90 %-97 %] 93 %  BP: (104-148)/(64-84) 138/67     Weight: (!) 145.8 kg (321 lb 6.9 oz)  Body mass index is 56.94 kg/m².     Physical Exam  Constitutional:       General: She is not in acute distress.     Appearance: She is not ill-appearing, toxic-appearing or diaphoretic.   HENT:      Head: Normocephalic and atraumatic.      Nose: No congestion or rhinorrhea.      Mouth/Throat:      Mouth: Mucous membranes are moist.      Pharynx: Oropharynx is clear. No oropharyngeal exudate or posterior oropharyngeal erythema.   Eyes:      Pupils: Pupils are equal, round, and reactive to light.   Cardiovascular:      Rate and Rhythm: Normal rate and  regular rhythm.      Pulses: Normal pulses.      Heart sounds: Normal heart sounds. No murmur heard.     No friction rub.      Comments: There is a Right chest wall Dailysis catheter with no drainage, redness/tenderness. There is a left upper arm AV fistula with a palpable thrill, no erythema or tenderness at site  Pulmonary:      Effort: Pulmonary effort is normal.      Breath sounds: Normal breath sounds. No wheezing, rhonchi or rales.   Abdominal:      General: Abdomen is flat. Bowel sounds are normal.      Palpations: Abdomen is soft.   Musculoskeletal:         General: No swelling.   Skin:     General: Skin is warm and dry.      Capillary Refill: Capillary refill takes less than 2 seconds.   Neurological:      General: No focal deficit present.      Mental Status: She is alert and oriented to person, place, and time.      Sensory: No sensory deficit.      Motor: No weakness.   Psychiatric:         Mood and Affect: Mood normal.         Behavior: Behavior normal.         Thought Content: Thought content normal.               Significant Labs: All pertinent labs within the past 24 hours have been reviewed.  BMP:   Recent Labs   Lab 12/27/24  2101 12/28/24  0547   * 221*   * 135*   K 2.8* 3.0*   CL 93* 95*   CO2 30* 30*   BUN 7 8   CREATININE 2.80* 3.47*   CALCIUM 8.5 8.1*   MG 1.8  --      CBC:   Recent Labs   Lab 12/28/24  0547   WBC 8.87   HGB 10.1*   HCT 31.2*        CMP:   Recent Labs   Lab 12/27/24  2101 12/28/24  0547   * 135*   K 2.8* 3.0*   CL 93* 95*   CO2 30* 30*   * 221*   BUN 7 8   CREATININE 2.80* 3.47*   CALCIUM 8.5 8.1*   PROT 8.0 6.9   ALBUMIN 3.0* 2.6*   BILITOT 0.5 0.5   ALKPHOS 135 120   AST 24 14   ALT <7 <7   ANIONGAP 13 13     Magnesium:   Recent Labs   Lab 12/27/24  2101   MG 1.8       Significant Imaging: I have reviewed all pertinent imaging results/findings within the past 24 hours.    Assessment and Plan     * Near syncope  Patietn had two episodes of  dizziness witthout C/P, SOB or palpitations today. While on dialysis its reported Pt became hypotensive. Pt completed dialysis. On her way home she felt light headed/weak again. Pt denies LOC this may be secondary to dehydration, electrolytes, Cardiogenic.   Pt received 500 CC LR bolus in ED    Last echo 11/7/24     Left Ventricle: Severely increased wall thickness. Normal (55 - 60%)   ejection fraction. Grade II (moderate) left ventricular diastolic   dysfunction. Abnormal septal motion. Systolic and diastolic flattening of   the interventricular septum consistent with right ventricle pressure and   volume overload.     Right Ventricle: The right ventricular cavity size is moderately   dilated. Mildly to moderately reduced systolic function.     Tricuspid Valve: Moderate to severe tricuspid regurgitation. No   stenosis. RVSP is 69.00 mmHg.     IVC/SVC: Elevated central venous pressure (15-20 mm Hg).     -telemetry  -trend troponin  -Echo in AM  -carotid doppler  Strict I/O  -Orthostatic VS  -  12/28- continuing work up, echo read pending    Morbid obesity with BMI of 50.0-59.9, adult  Body mass index is 56.94 kg/m². Morbid obesity complicates all aspects of disease management from diagnostic modalities to treatment. Weight loss encouraged and health benefits explained to patient.         ESRD (end stage renal disease) on dialysis  Creatine stable for now. BMP reviewed- noted  Based on current GFR, CKD stage is end stage.  Monitor UOP and serial BMP and adjust therapy as needed. Renally dose meds. Avoid nephrotoxic medications and procedures.  Pt does Dialysis MWF, Completed today    12/28 states she is supposed to have dialysis Sunday due to holiday schedule, will consult nephro    ROWDY (obstructive sleep apnea)  Pt diagnosed approximately 3 mos ago. Currently not on CPAP  Monitor closely  telemetry      Mixed hyperlipidemia  -atorvastatin 40 mg QHS      Diabetes mellitus type 2 in obese  Patient's FSGs are  "uncontrolled due to hyperglycemia on current medication regimen.  Last A1c reviewed-   Lab Results   Component Value Date    HGBA1C 7.8 (H) 12/27/2024     Most recent fingerstick glucose reviewed- No results for input(s): "POCTGLUCOSE" in the last 24 hours.  Current correctional scale  Medium  Increase anti-hyperglycemic dose as follows-   Antihyperglycemics (From admission, onward)      Start     Stop Route Frequency Ordered    12/27/24 2159  insulin aspart U-100 injection 0-10 Units         -- SubQ Before meals & nightly PRN 12/27/24 2059          Hold Oral hypoglycemics while patient is in the hospital.    SLE (systemic lupus erythematosus)  HX of SLE Followed by Rheumatology in [place] Dr Garcia  Was on prednisone but weaned off several months ago, Pt not taking prescribed Hydroxychloroquine because it makes her feel achy.  Pt has increased risk af athersclerosis  -lipid panel in AM        VTE Risk Mitigation (From admission, onward)           Ordered     IP VTE HIGH RISK PATIENT  Once         12/27/24 2327     Place SAUL hose  Until discontinued         12/27/24 2327                    Discharge Planning   SHERRIE:      Code Status: Full Code   Medical Readiness for Discharge Date:   Discharge Plan A: Home, Home with family                        Marion Antoine MD  Department of Hospital Medicine   [facility] - [address] [place] [address] Telemetry    "

## 2024-12-28 NOTE — ASSESSMENT & PLAN NOTE
HX of SLE Followed by Rheumatology in [place] Dr Garcia  Was on prednisone but weaned off several months ago, Pt not taking prescribed Hydroxychloroquine because it makes her feel achy.  Pt has increased risk af athersclerosis  -lipid panel in AM

## 2024-12-28 NOTE — ASSESSMENT & PLAN NOTE
Tolu had two episodes of dizziness witthout C/P, SOB or palpitations today. While on dialysis its reported Pt became hypotensive. Pt completed dialysis. On her way home she felt light headed/weak again. Pt denies LOC this may be secondary to dehydration, electrolytes, Cardiogenic.   Pt received 500 CC LR bolus in ED    Last echo 11/7/24     Left Ventricle: Severely increased wall thickness. Normal (55 - 60%)   ejection fraction. Grade II (moderate) left ventricular diastolic   dysfunction. Abnormal septal motion. Systolic and diastolic flattening of   the interventricular septum consistent with right ventricle pressure and   volume overload.     Right Ventricle: The right ventricular cavity size is moderately   dilated. Mildly to moderately reduced systolic function.     Tricuspid Valve: Moderate to severe tricuspid regurgitation. No   stenosis. RVSP is 69.00 mmHg.     IVC/SVC: Elevated central venous pressure (15-20 mm Hg).     -telemetry  -trend troponin  -Echo in AM  -carotid doppler  Strict I/O  -Orthostatic VS

## 2024-12-28 NOTE — ASSESSMENT & PLAN NOTE
"Patient's FSGs are uncontrolled due to hyperglycemia on current medication regimen.  Last A1c reviewed-   Lab Results   Component Value Date    HGBA1C 7.8 (H) 12/27/2024     Most recent fingerstick glucose reviewed- No results for input(s): "POCTGLUCOSE" in the last 24 hours.  Current correctional scale  Medium  Increase anti-hyperglycemic dose as follows-   Antihyperglycemics (From admission, onward)      Start     Stop Route Frequency Ordered    12/27/24 2159  insulin aspart U-100 injection 0-10 Units         -- SubQ Before meals & nightly PRN 12/27/24 2059          Hold Oral hypoglycemics while patient is in the hospital.  "

## 2024-12-28 NOTE — SUBJECTIVE & OBJECTIVE
Interval History:    Review of Systems   Constitutional:  Negative for activity change, chills and fever.   HENT:  Negative for trouble swallowing.    Eyes:  Negative for visual disturbance.   Respiratory:  Negative for cough, chest tightness, shortness of breath and wheezing.    Cardiovascular:  Negative for chest pain, palpitations and leg swelling.   Gastrointestinal:  Positive for nausea and vomiting. Negative for abdominal pain, constipation and diarrhea.   Genitourinary:  Negative for flank pain, frequency and hematuria.   Musculoskeletal:  Negative for arthralgias, back pain and neck pain.   Skin:  Negative for rash and wound.   Neurological:  Positive for weakness and light-headedness. Negative for seizures and headaches.     Objective:     Vital Signs (Most Recent):  Temp: 98.6 °F (37 °C) (12/28/24 1540)  Pulse: 61 (12/28/24 1540)  Resp: 20 (12/28/24 1540)  BP: 138/67 (12/28/24 1540)  SpO2: (!) 93 % (12/28/24 1540) Vital Signs (24h Range):  Temp:  [97.8 °F (36.6 °C)-98.6 °F (37 °C)] 98.6 °F (37 °C)  Pulse:  [56-82] 61  Resp:  [16-20] 20  SpO2:  [90 %-97 %] 93 %  BP: (104-148)/(64-84) 138/67     Weight: (!) 145.8 kg (321 lb 6.9 oz)  Body mass index is 56.94 kg/m².     Physical Exam  Constitutional:       General: She is not in acute distress.     Appearance: She is not ill-appearing, toxic-appearing or diaphoretic.   HENT:      Head: Normocephalic and atraumatic.      Nose: No congestion or rhinorrhea.      Mouth/Throat:      Mouth: Mucous membranes are moist.      Pharynx: Oropharynx is clear. No oropharyngeal exudate or posterior oropharyngeal erythema.   Eyes:      Pupils: Pupils are equal, round, and reactive to light.   Cardiovascular:      Rate and Rhythm: Normal rate and regular rhythm.      Pulses: Normal pulses.      Heart sounds: Normal heart sounds. No murmur heard.     No friction rub.      Comments: There is a Right chest wall Dailysis catheter with no drainage, redness/tenderness. There is a  left upper arm AV fistula with a palpable thrill, no erythema or tenderness at site  Pulmonary:      Effort: Pulmonary effort is normal.      Breath sounds: Normal breath sounds. No wheezing, rhonchi or rales.   Abdominal:      General: Abdomen is flat. Bowel sounds are normal.      Palpations: Abdomen is soft.   Musculoskeletal:         General: No swelling.   Skin:     General: Skin is warm and dry.      Capillary Refill: Capillary refill takes less than 2 seconds.   Neurological:      General: No focal deficit present.      Mental Status: She is alert and oriented to person, place, and time.      Sensory: No sensory deficit.      Motor: No weakness.   Psychiatric:         Mood and Affect: Mood normal.         Behavior: Behavior normal.         Thought Content: Thought content normal.               Significant Labs: All pertinent labs within the past 24 hours have been reviewed.  BMP:   Recent Labs   Lab 12/27/24 2101 12/28/24  0547   * 221*   * 135*   K 2.8* 3.0*   CL 93* 95*   CO2 30* 30*   BUN 7 8   CREATININE 2.80* 3.47*   CALCIUM 8.5 8.1*   MG 1.8  --      CBC:   Recent Labs   Lab 12/28/24  0547   WBC 8.87   HGB 10.1*   HCT 31.2*        CMP:   Recent Labs   Lab 12/27/24  2101 12/28/24  0547   * 135*   K 2.8* 3.0*   CL 93* 95*   CO2 30* 30*   * 221*   BUN 7 8   CREATININE 2.80* 3.47*   CALCIUM 8.5 8.1*   PROT 8.0 6.9   ALBUMIN 3.0* 2.6*   BILITOT 0.5 0.5   ALKPHOS 135 120   AST 24 14   ALT <7 <7   ANIONGAP 13 13     Magnesium:   Recent Labs   Lab 12/27/24  2101   MG 1.8       Significant Imaging: I have reviewed all pertinent imaging results/findings within the past 24 hours.

## 2024-12-28 NOTE — ASSESSMENT & PLAN NOTE
Creatine stable for now. BMP reviewed- noted  Based on current GFR, CKD stage is end stage.  Monitor UOP and serial BMP and adjust therapy as needed. Renally dose meds. Avoid nephrotoxic medications and procedures.  Pt does Dialysis MWF, Completed today

## 2024-12-28 NOTE — PLAN OF CARE
"[facility] - [address] [place] [address] Telemetry  Initial Discharge Assessment       Primary Care Provider: Breana Terrell DO    Admission Diagnosis: Near syncope [R55]    Admission Date: 12/27/2024  Expected Discharge Date:     Transition of Care Barriers: (P) None    Payor: BLUE CROSS BLUE SHIELD / Plan: [facility] FEDERAL BASIC / Product Type: PPO /     Extended Emergency Contact Information  Primary Emergency Contact: Brien Wilson  Mobile Phone: [phone]  Relation: Father  Preferred language: English   needed? No    Discharge Plan A: (P) Home, Home with family  Discharge Plan B: (P) Rehab, Skilled Nursing Facility, Home Health, Long-term acute care facility (LTAC)      [facility] [address] [address]  Phone: [phone] Fax: [phone]      Initial Assessment (most recent)       Adult Discharge Assessment - 12/28/24 1056          Discharge Assessment    Assessment Type Discharge Planning Assessment (P)      Confirmed/corrected address, phone number and insurance Yes (P)      Confirmed Demographics Correct on Facesheet (P)      Source of Information patient (P)      Communicated SHERRIE with patient/caregiver Date not available/Unable to determine (P)      People in Home parent(s) (P)    MotherFloridalma [phone]    Do you expect to return to your current living situation? Yes (P)      Do you have help at home or someone to help you manage your care at home? Yes (P)      Who are your caregiver(s) and their phone number(s)? Floridalma Adams [phone] (P)      Prior to hospitilization cognitive status: Alert/Oriented (P)      Current cognitive status: Alert/Oriented (P)      Walking or Climbing Stairs Difficulty yes (P)    "I use a wheelchair at times when I am standing for a long time."    Walking or Climbing Stairs ambulation difficulty, assistance 1 person (P)      Dressing/Bathing Difficulty no (P)      " "Home Accessibility wheelchair accessible (P)      Home Layout Able to live on 1st floor (P)      Equipment Currently Used at Home wheelchair (P)    "I use a wheelchair at times when I am standing for a long time."    Readmission within 30 days? No (P)      Patient currently being followed by outpatient case management? No (P)      Do you currently have service(s) that help you manage your care at home? No (P)      Do you take prescription medications? Yes (P)      Do you have prescription coverage? Yes (P)      Coverage [employer] (P)      Do you have any problems affording any of your prescribed medications? No (P)      Who is going to help you get home at discharge? Mother, Floridalma Sumner and family (P)      How do you get to doctors appointments? car, drives self (P)      Are you on dialysis? Yes (P)      Dialysis Name and Scheduled days [facility] (P)      Do you take coumadin? No (P)      Discharge Plan A Home;Home with family (P)      Discharge Plan B Rehab;Skilled Nursing Facility;Home Health;Long-term acute care facility (LTAC) (P)      DME Needed Upon Discharge  none (P)      Discharge Plan discussed with: Patient (P)      Transition of Care Barriers None (P)         Physical Activity    On average, how many days per week do you engage in moderate to strenuous exercise (like a brisk walk)? 5 days (P)      On average, how many minutes do you engage in exercise at this level? 30 min (P)         Financial Resource Strain    How hard is it for you to pay for the very basics like food, housing, medical care, and heating? Not hard at all (P)         Housing Stability    In the last 12 months, was there a time when you were not able to pay the mortgage or rent on time? No (P)      At any time in the past 12 months, were you homeless or living in a shelter (including now)? No (P)         Transportation Needs    Has the lack of transportation kept you from medical appointments, meetings, work or from " getting things needed for daily living? No (P)         Food Insecurity    Within the past 12 months, you worried that your food would run out before you got the money to buy more. Never true (P)      Within the past 12 months, the food you bought just didn't last and you didn't have money to get more. Never true (P)         Stress    Do you feel stress - tense, restless, nervous, or anxious, or unable to sleep at night because your mind is troubled all the time - these days? Not at all (P)         Social Isolation    How often do you feel lonely or isolated from those around you?  Never (P)         Alcohol Use    Q1: How often do you have a drink containing alcohol? Never (P)      Q2: How many drinks containing alcohol do you have on a typical day when you are drinking? Patient does not drink (P)      Q3: How often do you have six or more drinks on one occasion? Never (P)         Utilities    In the past 12 months has the electric, gas, oil, or water company threatened to shut off services in your home? No (P)         Health Literacy    How often do you need to have someone help you when you read instructions, pamphlets, or other written material from your doctor or pharmacy? Never (P)         OTHER    Name(s) of People in Home Mother, Floridalma Sumner [phone] (P)                  Pt was seen by LSW on today for initial assessment. Pt was alert and oriented x3. Pt stated that she uses a wheelchair only when she stands for a long period of time. Pt lives with her mother, Floridalma Sumner and will return home when she is eligible for dc. [facility]. SS will follow up with dc planning.

## 2024-12-28 NOTE — ASSESSMENT & PLAN NOTE
Body mass index is 56.94 kg/m². Morbid obesity complicates all aspects of disease management from diagnostic modalities to treatment. Weight loss encouraged and health benefits explained to patient.

## 2024-12-28 NOTE — ASSESSMENT & PLAN NOTE
Creatine stable for now. BMP reviewed- noted  Based on current GFR, CKD stage is end stage.  Monitor UOP and serial BMP and adjust therapy as needed. Renally dose meds. Avoid nephrotoxic medications and procedures.  Pt does Dialysis MWF, Completed today    12/28 states she is supposed to have dialysis Sunday due to holiday schedule, will consult nephro

## 2024-12-29 VITALS
HEART RATE: 65 BPM | BODY MASS INDEX: 51.91 KG/M2 | OXYGEN SATURATION: 80 % | SYSTOLIC BLOOD PRESSURE: 143 MMHG | WEIGHT: 293 LBS | HEIGHT: 63 IN | DIASTOLIC BLOOD PRESSURE: 86 MMHG | RESPIRATION RATE: 20 BRPM | TEMPERATURE: 99 F

## 2024-12-29 LAB
GLUCOSE SERPL-MCNC: 281 MG/DL (ref 70–105)
GLUCOSE SERPL-MCNC: 285 MG/DL (ref 70–105)
GLUCOSE SERPL-MCNC: 300 MG/DL (ref 70–105)
GLUCOSE SERPL-MCNC: 301 MG/DL (ref 70–105)
OHS QRS DURATION: 170 MS
OHS QTC CALCULATION: 499 MS

## 2024-12-29 PROCEDURE — 99239 HOSP IP/OBS DSCHRG MGMT >30: CPT | Mod: ,,,

## 2024-12-29 PROCEDURE — 82962 GLUCOSE BLOOD TEST: CPT

## 2024-12-29 RX ORDER — CALCITRIOL 0.25 UG/1
0.25 CAPSULE ORAL DAILY
COMMUNITY

## 2024-12-29 RX ORDER — DOXAZOSIN 4 MG/1
4 TABLET ORAL NIGHTLY
COMMUNITY

## 2024-12-29 NOTE — DISCHARGE SUMMARY
[facility] - [facility] Tele[facility] Hospital Medicine  Discharge Summary      Patient Name: Panchito Wilson  MRN: [id number]  HARRIET: [id number]  Patient Class: IP- Inpatient  Admission Date: 12/27/2024  Hospital Length of Stay: 1 days  Discharge Date and Time:  12/29/2024 3:23 PM  Attending Physician: Marion Antoine MD   Discharging Provider: Marion Antoine MD  Primary Care Provider: Breana Terrell DO    Primary Care Team: Networked reference to record PCT     HPI:   44 y.o. female with PMH significant for ESRD on HD, anemia of CKD, obesity, history of lupus, hypertension and diabetes who presented to [facility] for a complaint of Light headedness/weakness. Pt reports not sleeping well and experienced nausea and vomiting around 3 AM, likely exacerbated by cleaning and changing a bandage, leading to a dry mouth and a bad taste. The patient attended dialysis today, during which she felt lightheaded. The dialysis team noted a drop in blood pressure and decided not to pull fluid but only filtered the blood. The patient reported feeling better after dialysis and consumed food without further issues. However, upon driving home, the patient experienced lightheadedness/weakness again and decided to go to the hospital to avoid the risk of falling. The patient did not report chest pain or shortness of breath or palpitations.     Presenting Vitals at [facility] were /51, HR 65, SpO2 92% temp 36.8°.  She lifted findings were hemoglobin 10.3, hematocrit 32.2.  Sodium 134 potassium 2.8, glucose 287.  Troponin was found to be less than 0.05.  ProBNP is elevated at 7 504.8.  EKG shows a right bundle branch block with no ST elevation or depression rate of 61.  While in the ED at [place] patient received a 500 cc lactated ringer bolus.    Patient will be admitted to observation under the direct supervision of Dr. Obrien for further evaluation and management.     We will place patient on telemetry, start potassium replacement  conservatively.Check a magnesium, phosphorus and troponin I and get a baseline EKG.    * No surgery found *      Hospital Course:   12/28- continues to complain of lightheadedness, will check orthostatics as well as continued work up, states she is planned for dialysis tomorrow due to holiday schedule so will consult nephro to continue and likely discharge tomorrow   12/29- lightheadedness resolved today, orthostatics negative, dialysis not available today, confirmed she does have a seat for 6 am tomorrow. Thorough review of medications today as patient had many duplicate and multiple medications. Suspect this was cause along with dialysis causing symptoms. Ok to discharge today      Goals of Care Treatment Preferences:  Code Status: Full Code      SDOH Screening:  The patient was screened for utility difficulties, food insecurity, transport difficulties, housing insecurity, and interpersonal safety and there were no concerns identified this admission.     Consults:   Consults (From admission, onward)          Status Ordering Provider     Inpatient consult to Nephrology  Once        Provider:  (Not yet assigned)    Ordered ALANA REY            * Near syncope  Tolu had two episodes of dizziness witthout C/P, SOB or palpitations today. While on dialysis its reported Pt became hypotensive. Pt completed dialysis. On her way home she felt light headed/weak again. Pt denies LOC this may be secondary to dehydration, electrolytes, Cardiogenic.   Pt received 500 CC LR bolus in ED    Last echo 11/7/24     Left Ventricle: Severely increased wall thickness. Normal (55 - 60%)   ejection fraction. Grade II (moderate) left ventricular diastolic   dysfunction. Abnormal septal motion. Systolic and diastolic flattening of   the interventricular septum consistent with right ventricle pressure and   volume overload.     Right Ventricle: The right ventricular cavity size is moderately   dilated. Mildly to moderately reduced  "systolic function.     Tricuspid Valve: Moderate to severe tricuspid regurgitation. No   stenosis. RVSP is 69.00 mmHg.     IVC/SVC: Elevated central venous pressure (15-20 mm Hg).     -telemetry  -trend troponin  -Echo in AM, done in last 3 months, no indication to repeat  -carotid doppler  Strict I/O  -Orthostatic VS  -    Morbid obesity with BMI of 50.0-59.9, adult  Body mass index is 56.94 kg/m². Morbid obesity complicates all aspects of disease management from diagnostic modalities to treatment. Weight loss encouraged and health benefits explained to patient.         ESRD (end stage renal disease) on dialysis  Creatine stable for now. BMP reviewed- noted  Based on current GFR, CKD stage is end stage.  Monitor UOP and serial BMP and adjust therapy as needed. Renally dose meds. Avoid nephrotoxic medications and procedures.  Pt does Dialysis MWF, Completed today    12/28 states she is supposed to have dialysis Sunday due to holiday schedule, will consult nephro    ROWDY (obstructive sleep apnea)  Pt diagnosed approximately 3 mos ago. Currently not on CPAP  Monitor closely  telemetry      Mixed hyperlipidemia  -atorvastatin 40 mg QHS      Diabetes mellitus type 2 in obese  Patient's FSGs are uncontrolled due to hyperglycemia on current medication regimen.  Last A1c reviewed-   Lab Results   Component Value Date    HGBA1C 7.8 (H) 12/27/2024     Most recent fingerstick glucose reviewed- No results for input(s): "POCTGLUCOSE" in the last 24 hours.  Current correctional scale  Medium  Increase anti-hyperglycemic dose as follows-   Antihyperglycemics (From admission, onward)      Start     Stop Route Frequency Ordered    12/27/24 2159  insulin aspart U-100 injection 0-10 Units         -- SubQ Before meals & nightly PRN 12/27/24 2059          Hold Oral hypoglycemics while patient is in the hospital.    SLE (systemic lupus erythematosus)  HX of SLE Followed by Rheumatology in [place] Dr Garcia  Was on prednisone but weaned off " several months ago, Pt not taking prescribed Hydroxychloroquine because it makes her feel achy.  Pt has increased risk af athersclerosis  -lipid panel in AM        Final Active Diagnoses:    Diagnosis Date Noted POA    PRINCIPAL PROBLEM:  Near syncope [R55] 12/27/2024 Yes    Morbid obesity with BMI of 50.0-59.9, adult [E66.01, Z68.43] 12/28/2024 Not Applicable    SLE (systemic lupus erythematosus) [M32.9]  Yes    Diabetes mellitus type 2 in obese [E11.69, E66.9]  Yes    Mixed hyperlipidemia [E78.2]  Yes    ESRD (end stage renal disease) on dialysis [N18.6, Z99.2] 10/01/2024 Not Applicable    ROWDY (obstructive sleep apnea) [G47.33] 05/20/2021 Yes      Problems Resolved During this Admission:       Discharged Condition: stable    Disposition: Home-Health Care c    Follow Up:    Patient Instructions:      Diet Cardiac     Diet renal     Diet diabetic     Notify your health care provider if you experience any of the following:  temperature >100.4     Notify your health care provider if you experience any of the following:  persistent nausea and vomiting or diarrhea     Notify your health care provider if you experience any of the following:  persistent dizziness, light-headedness, or visual disturbances     Notify your health care provider if you experience any of the following:  increased confusion or weakness     Activity as tolerated       Significant Diagnostic Studies: N/A    Pending Diagnostic Studies:       Procedure Component Value Units Date/Time    CBC Auto Differential [[id number]] Collected: 12/27/24 2101    Order Status: Sent Lab Status: No result     Specimen: Blood     Narrative:      The following orders were created for panel order CBC Auto Differential.  Procedure                               Abnormality         Status                     ---------                               -----------         ------                     CBC with Differential[[id number]]                                                         Please view results for these tests on the individual orders.    CBC with Differential [[id number]] Collected: 12/27/24 2101    Order Status: Sent Lab Status: No result     Specimen: Blood     EKG 12-lead [[id number]]     Order Status: Sent Lab Status: No result     HCG, Quantitative [[id number]] Collected: 12/27/24 2101    Order Status: Sent Lab Status: In process Updated: 12/27/24 2118    Specimen: Blood            Medications:  Reconciled Home Medications:      Medication List        CHANGE how you take these medications      albuterol 90 mcg/actuation inhaler  Commonly known as: PROVENTIL/VENTOLIN HFA  Inhale 1 puff into the lungs.  What changed: Another medication with the same name was removed. Continue taking this medication, and follow the directions you see here.            CONTINUE taking these medications      amLODIPine 10 MG tablet  Commonly known as: NORVASC  Take 10 mg by mouth.     carvediloL 12.5 MG tablet  Commonly known as: COREG  1 tablet with food Orally Twice a day for 30 day(s)     CombiVENT RESPIMAT  mcg/actuation inhaler  Generic drug: ipratropium-albuteroL  Inhale 1 puff into the lungs 4 (four) times daily. Rescue     doxazosin 4 MG tablet  Commonly known as: CARDURA  Take 4 mg by mouth every evening.     famotidine 20 MG tablet  Commonly known as: PEPCID  1 tablet at bedtime as needed Orally Once a day while on Prednisone for 30 day(s)     HumuLIN 70/30 U-100 Insulin 100 unit/mL (70-30) injection  Generic drug: insulin NPH-insulin regular (70/30)  Inject 90 Units into the skin once daily.     hydrALAZINE 100 MG tablet  Commonly known as: APRESOLINE  Take 1 tablet by mouth 2 (two) times daily.     insulin  unit/mL injection  Inject into the skin 2 (two) times daily before meals.     JARDIANCE 10 mg tablet  Generic drug: empagliflozin  Take by mouth.     levothyroxine 100 MCG tablet  Commonly known as: SYNTHROID  Take 50 mcg by mouth before breakfast.      medroxyPROGESTERone 10 MG tablet  Commonly known as: PROVERA  Take 1 tablet (10 mg total) by mouth once daily.     montelukast 10 mg tablet  Commonly known as: SINGULAIR  Take 10 mg by mouth every evening.     NIFEdipine 60 MG Tbsr  Commonly known as: ADALAT CC  1 tablet on an empty stomach Orally Once a day for 30 day(s)     rosuvastatin 10 MG tablet  Commonly known as: CRESTOR  1 tablet Orally Once a day for 30 day(s)     sevelamer carbonate 800 mg Tab  Commonly known as: RENVELA  1 tablet with meals Orally Three times a day for 30 day(s)            STOP taking these medications      furosemide 40 MG tablet  Commonly known as: LASIX     furosemide 80 MG tablet  Commonly known as: LASIX     metOLazone 5 MG tablet  Commonly known as: ZAROXOLYN     metoprolol succinate 100 MG 24 hr tablet  Commonly known as: TOPROL-XL     pregabalin 75 MG capsule  Commonly known as: LYRICA            ASK your doctor about these medications      calcitRIOL 0.25 MCG Cap  Commonly known as: ROCALTROL  Take 0.25 mcg by mouth once daily.              Indwelling Lines/Drains at time of discharge:   Lines/Drains/Airways       Central Venous Catheter Line  Duration                  Hemodialysis Catheter right subclavian -- days                    Time spent on the discharge of patient: 45 minutes         Marion Antoine MD  Department of Hospital Medicine  [facility] - [facility]

## 2024-12-29 NOTE — DISCHARGE INSTRUCTIONS
Discussed with the patient and all questioned fully answered. She will F/U with Dialysis center in AM . Apt made [id number] am

## 2024-12-29 NOTE — NURSING
Discussed with the patient and all questioned fully answered. Patient  will f/u with Dialysis center in AM apt at 0630 Apt made and cofirmed per patiemt . Verbalized understanding of all AVS instructions pt AAAOX4 VSS NAD noted at this time.  Pt will D/C home per POV with family .

## 2024-12-29 NOTE — ASSESSMENT & PLAN NOTE
Tolu had two episodes of dizziness witthout C/P, SOB or palpitations today. While on dialysis its reported Pt became hypotensive. Pt completed dialysis. On her way home she felt light headed/weak again. Pt denies LOC this may be secondary to dehydration, electrolytes, Cardiogenic.   Pt received 500 CC LR bolus in ED    Last echo 11/7/24     Left Ventricle: Severely increased wall thickness. Normal (55 - 60%)   ejection fraction. Grade II (moderate) left ventricular diastolic   dysfunction. Abnormal septal motion. Systolic and diastolic flattening of   the interventricular septum consistent with right ventricle pressure and   volume overload.     Right Ventricle: The right ventricular cavity size is moderately   dilated. Mildly to moderately reduced systolic function.     Tricuspid Valve: Moderate to severe tricuspid regurgitation. No   stenosis. RVSP is 69.00 mmHg.     IVC/SVC: Elevated central venous pressure (15-20 mm Hg).     -telemetry  -trend troponin  -Echo in AM, done in last 3 months, no indication to repeat  -carotid doppler  Strict I/O  -Orthostatic VS  -

## 2024-12-29 NOTE — PLAN OF CARE
Problem: Adult Inpatient Plan of Care  Goal: Plan of Care Review  Outcome: Progressing  Goal: Patient-Specific Goal (Individualized)  Outcome: Progressing  Goal: Absence of Hospital-Acquired Illness or Injury  Outcome: Progressing  Goal: Optimal Comfort and Wellbeing  Outcome: Progressing  Goal: Readiness for Transition of Care  Outcome: Progressing     Problem: Infection  Goal: Absence of Infection Signs and Symptoms  Outcome: Progressing     Problem: Skin Injury Risk Increased  Goal: Skin Health and Integrity  Outcome: Progressing     Problem: Diabetes Comorbidity  Goal: Blood Glucose Level Within Targeted Range  Outcome: Progressing     Problem: Bariatric Environmental Safety  Goal: Safety Maintained with Care  Outcome: Progressing

## 2024-12-30 LAB — HCG SERPL-ACNC: 3 MIU/ML

## 2025-01-02 LAB — BACTERIA BLD CULT: NORMAL

## (undated) DEVICE — CLOTH SKIN PREP 2% 7.5X7.5IN

## (undated) DEVICE — BAND TR WITH INFLATOR

## (undated) DEVICE — DECANTER FLUID TRNSF WHITE 9IN

## (undated) DEVICE — CONTRAST ISOVUE 370 100ML

## (undated) DEVICE — SET EXT IV CATH ONE LINK 8.5IN

## (undated) DEVICE — CUFF FLEXIPORT BP LONG ADULT

## (undated) DEVICE — Device

## (undated) DEVICE — SYR MED RAD 150ML

## (undated) DEVICE — GLOVE PROTEXIS PI CRM 5.5

## (undated) DEVICE — KIT GLIDESHEATH SLEND 6FR 10CM

## (undated) DEVICE — DRESSING TRANS 4X4 TEGADERM

## (undated) DEVICE — CATH IV 22G X 1 AUTOGUARD

## (undated) DEVICE — GLOVE BIOGEL SKINSENSE PI 7.5

## (undated) DEVICE — PROTECTOR ULNAR NERVE FOAM

## (undated) DEVICE — CHLORAPREP 10.5 ML APPLICATOR

## (undated) DEVICE — ADPT IV NDLLSS M LL CLRLNK

## (undated) DEVICE — STOPCOCK 3 WAY MED PRESSURE

## (undated) DEVICE — KIT IV START RUSH

## (undated) DEVICE — SET EXTENSION CLEARLINK 2INJ

## (undated) DEVICE — WIRE GUIDE .035 260CML

## (undated) DEVICE — DRAPE ANGIO BRACH 38X44IN

## (undated) DEVICE — CATH IV 20G 1.16 IN AUTOGARD

## (undated) DEVICE — SET IV PRIMARY

## (undated) DEVICE — COVER PROBE US GEL BAND